# Patient Record
Sex: MALE | Race: WHITE | Employment: OTHER | URBAN - METROPOLITAN AREA
[De-identification: names, ages, dates, MRNs, and addresses within clinical notes are randomized per-mention and may not be internally consistent; named-entity substitution may affect disease eponyms.]

---

## 2018-01-01 ENCOUNTER — OFFICE VISIT (OUTPATIENT)
Dept: NEUROLOGY | Age: 83
End: 2018-01-01

## 2018-01-01 ENCOUNTER — OFFICE VISIT (OUTPATIENT)
Dept: FAMILY MEDICINE CLINIC | Age: 83
End: 2018-01-01

## 2018-01-01 ENCOUNTER — TELEPHONE (OUTPATIENT)
Dept: FAMILY MEDICINE CLINIC | Age: 83
End: 2018-01-01

## 2018-01-01 ENCOUNTER — HOSPITAL ENCOUNTER (EMERGENCY)
Age: 83
Discharge: HOME | End: 2018-11-20
Attending: EMERGENCY MEDICINE | Admitting: EMERGENCY MEDICINE
Payer: MEDICARE

## 2018-01-01 VITALS
BODY MASS INDEX: 25.69 KG/M2 | HEIGHT: 67 IN | DIASTOLIC BLOOD PRESSURE: 84 MMHG | SYSTOLIC BLOOD PRESSURE: 132 MMHG | OXYGEN SATURATION: 95 % | HEART RATE: 58 BPM

## 2018-01-01 VITALS
TEMPERATURE: 97 F | WEIGHT: 164 LBS | RESPIRATION RATE: 18 BRPM | OXYGEN SATURATION: 98 % | DIASTOLIC BLOOD PRESSURE: 70 MMHG | HEIGHT: 68 IN | BODY MASS INDEX: 24.86 KG/M2 | HEART RATE: 65 BPM | SYSTOLIC BLOOD PRESSURE: 135 MMHG

## 2018-01-01 VITALS
HEART RATE: 75 BPM | TEMPERATURE: 97.9 F | HEIGHT: 70 IN | BODY MASS INDEX: 25.05 KG/M2 | OXYGEN SATURATION: 95 % | SYSTOLIC BLOOD PRESSURE: 130 MMHG | WEIGHT: 175 LBS | RESPIRATION RATE: 16 BRPM | DIASTOLIC BLOOD PRESSURE: 59 MMHG

## 2018-01-01 DIAGNOSIS — K94.00 COLOSTOMY COMPLICATION (HCC): Primary | ICD-10-CM

## 2018-01-01 DIAGNOSIS — L85.3 DRY SKIN DERMATITIS: ICD-10-CM

## 2018-01-01 DIAGNOSIS — Z43.2 ILEOSTOMY CARE (HCC): ICD-10-CM

## 2018-01-01 DIAGNOSIS — Z85.46 HISTORY OF PROSTATE CANCER: ICD-10-CM

## 2018-01-01 DIAGNOSIS — I10 ESSENTIAL HYPERTENSION: ICD-10-CM

## 2018-01-01 DIAGNOSIS — G20 PD (PARKINSON'S DISEASE) (HCC): Primary | ICD-10-CM

## 2018-01-01 DIAGNOSIS — R26.9 GAIT ABNORMALITY: ICD-10-CM

## 2018-01-01 DIAGNOSIS — Z85.118 HISTORY OF LUNG CANCER: ICD-10-CM

## 2018-01-01 DIAGNOSIS — R41.3 MEMORY LOSS: ICD-10-CM

## 2018-01-01 DIAGNOSIS — G20 PARKINSON DISEASE (HCC): Primary | ICD-10-CM

## 2018-01-01 DIAGNOSIS — Z85.048 HISTORY OF COLORECTAL CANCER: ICD-10-CM

## 2018-01-01 LAB
ALBUMIN SERPL-MCNC: 3.2 G/DL (ref 3.5–5)
ALBUMIN/GLOB SERPL: 1 {RATIO} (ref 1.1–2.2)
ALP SERPL-CCNC: 76 U/L (ref 45–117)
ALT SERPL-CCNC: 9 U/L (ref 12–78)
ANION GAP SERPL CALC-SCNC: 7 MMOL/L (ref 5–15)
AST SERPL-CCNC: 11 U/L (ref 15–37)
BASOPHILS # BLD: 0 K/UL (ref 0–0.1)
BASOPHILS NFR BLD: 1 % (ref 0–1)
BILIRUB SERPL-MCNC: 0.5 MG/DL (ref 0.2–1)
BUN SERPL-MCNC: 21 MG/DL (ref 6–20)
BUN/CREAT SERPL: 33 (ref 12–20)
CALCIUM SERPL-MCNC: 8.3 MG/DL (ref 8.5–10.1)
CHLORIDE SERPL-SCNC: 105 MMOL/L (ref 97–108)
CO2 SERPL-SCNC: 30 MMOL/L (ref 21–32)
COMMENT, HOLDF: NORMAL
CREAT SERPL-MCNC: 0.63 MG/DL (ref 0.7–1.3)
DIFFERENTIAL METHOD BLD: ABNORMAL
EOSINOPHIL # BLD: 0.3 K/UL (ref 0–0.4)
EOSINOPHIL NFR BLD: 5 % (ref 0–7)
ERYTHROCYTE [DISTWIDTH] IN BLOOD BY AUTOMATED COUNT: 16 % (ref 11.5–14.5)
GLOBULIN SER CALC-MCNC: 3.3 G/DL (ref 2–4)
GLUCOSE SERPL-MCNC: 104 MG/DL (ref 65–100)
HCT VFR BLD AUTO: 34.4 % (ref 36.6–50.3)
HGB BLD-MCNC: 10.5 G/DL (ref 12.1–17)
IMM GRANULOCYTES # BLD: 0 K/UL (ref 0–0.04)
IMM GRANULOCYTES NFR BLD AUTO: 0 % (ref 0–0.5)
LYMPHOCYTES # BLD: 1.1 K/UL (ref 0.8–3.5)
LYMPHOCYTES NFR BLD: 18 % (ref 12–49)
MCH RBC QN AUTO: 26.9 PG (ref 26–34)
MCHC RBC AUTO-ENTMCNC: 30.5 G/DL (ref 30–36.5)
MCV RBC AUTO: 88.2 FL (ref 80–99)
MONOCYTES # BLD: 0.7 K/UL (ref 0–1)
MONOCYTES NFR BLD: 12 % (ref 5–13)
NEUTS SEG # BLD: 3.8 K/UL (ref 1.8–8)
NEUTS SEG NFR BLD: 65 % (ref 32–75)
NRBC # BLD: 0 K/UL (ref 0–0.01)
NRBC BLD-RTO: 0 PER 100 WBC
PLATELET # BLD AUTO: 223 K/UL (ref 150–400)
PMV BLD AUTO: 9.6 FL (ref 8.9–12.9)
POTASSIUM SERPL-SCNC: 3.6 MMOL/L (ref 3.5–5.1)
PROT SERPL-MCNC: 6.5 G/DL (ref 6.4–8.2)
RBC # BLD AUTO: 3.9 M/UL (ref 4.1–5.7)
SAMPLES BEING HELD,HOLD: NORMAL
SODIUM SERPL-SCNC: 142 MMOL/L (ref 136–145)
WBC # BLD AUTO: 5.9 K/UL (ref 4.1–11.1)

## 2018-01-01 PROCEDURE — 99284 EMERGENCY DEPT VISIT MOD MDM: CPT

## 2018-01-01 PROCEDURE — 85025 COMPLETE CBC W/AUTO DIFF WBC: CPT

## 2018-01-01 PROCEDURE — 36415 COLL VENOUS BLD VENIPUNCTURE: CPT

## 2018-01-01 PROCEDURE — 80053 COMPREHEN METABOLIC PANEL: CPT

## 2018-01-01 RX ORDER — HYDROCODONE BITARTRATE AND ACETAMINOPHEN 10; 325 MG/1; MG/1
1 TABLET ORAL
Status: ON HOLD | COMMUNITY
End: 2019-01-01 | Stop reason: SDUPTHER

## 2018-01-01 RX ORDER — HYDROCODONE BITARTRATE AND ACETAMINOPHEN 10; 325 MG/1; MG/1
1 TABLET ORAL
OUTPATIENT
Start: 2018-01-01

## 2018-01-01 RX ORDER — CARBIDOPA AND LEVODOPA 25; 100 MG/1; MG/1
1 TABLET ORAL 2 TIMES DAILY
COMMUNITY
End: 2018-01-01

## 2018-01-01 RX ORDER — CARBIDOPA AND LEVODOPA 25; 100 MG/1; MG/1
TABLET ORAL
Qty: 300 TAB | Refills: 3 | Status: SHIPPED | OUTPATIENT
Start: 2018-01-01 | End: 2019-01-01

## 2018-01-01 RX ORDER — CARBIDOPA AND LEVODOPA 10; 100 MG/1; MG/1
1 TABLET ORAL 3 TIMES DAILY
COMMUNITY
End: 2018-01-01

## 2018-11-20 NOTE — ED PROVIDER NOTES
Ostomy was leaking last night Has no supplies Just moved here from [facility] 3 days ago. Has been staying in [facility] recently in a hotel. Wife says they found a house here in [place] now. Does not know where supplies are. Has Parkinsons Had Colostomy LLQ since 3 yrs ago. Is , lives with wife Nonsmoker Has chronic generalized mild weakness 2/2 Parkinsons Good appetite No recent illness No pain Needs: PCP Stoma nurse CM consult. Now lives locally Past Medical History:  
Diagnosis Date  Colon cancer ([facility]  Hypertension  Lung cancer ([facility]  Parkinson disease ([facility]  Prostate cancer ([facility] Past Surgical History:  
Procedure Laterality Date  HX COLOSTOMY  HX PROSTATECTOMY History reviewed. No pertinent family history. Social History Socioeconomic History  Marital status:  Spouse name: Not on file  Number of children: Not on file  Years of education: Not on file  Highest education level: Not on file Social Needs  Financial resource strain: Not on file  Food insecurity - worry: Not on file  Food insecurity - inability: Not on file  Transportation needs - medical: Not on file  Transportation needs - non-medical: Not on file Occupational History  Not on file Tobacco Use  Smoking status: Former Smoker  Smokeless tobacco: Never Used Substance and Sexual Activity  Alcohol use: Yes Frequency: Never Comment: occ  Drug use: Not on file  Sexual activity: Not on file Other Topics Concern  Not on file Social History Narrative  Not on file ALLERGIES: Patient has no allergy information on record. Review of Systems All other systems reviewed and are negative. Vitals:  
 11/20/18 1157 11/20/18 1230 11/20/18 1300 BP: 100/51 119/52 134/55 Pulse: 71 Resp: 16 Temp: 97.3 °F (36.3 °C) SpO2: 98% 97% 96% Weight: 79.4 kg (175 lb) Height: 5' 10\" (1.778 m) Physical Exam  
Constitutional: No distress. Frail, elderly, awake, alert. HENT:  
Head: Normocephalic. Neck: No tracheal deviation present. Cardiovascular: Normal rate and normal heart sounds. Pulmonary/Chest: Effort normal.  
Abdominal: Soft. He exhibits no distension. There is no tenderness. Colostomy LLQ Neurological: He is alert. Skin: Skin is warm and dry. Capillary refill takes less than 2 seconds. He is not diaphoretic. Psychiatric: He has a normal mood and affect. MDM Procedures 
 
 
 
consulted ostomy nurse Consulted case management as well New move to [facility] Wife says she and pt need referrals to a new PCP and  
 
 
CM has arranged: PCP appt tomorrow PT eval next week Ostomy nurse visits Will also [facility] to get [facility] Reviewed CBC WITH AUTOMATED DIFF - Abnormal; Notable for the following components:  
    Result Value RBC 3.90 (*) HGB 10.5 (*) HCT 34.4 (*)   
 RDW 16.0 (*) All other components within normal limits METABOLIC PANEL, COMPREHENSIVE - Abnormal; Notable for the following components:  
 Glucose 104 (*) BUN 21 (*) Creatinine 0.63 (*)   
 BUN/Creatinine ratio 33 (*) Calcium 8.3 (*) ALT (SGPT) 9 (*) AST (SGOT) 11 (*) Albumin 3.2 (*) A-G Ratio 1.0 (*) All other components within normal limits SAMPLES BEING HELD  
SAMPLES BEING HELD  
SAMPLE TO BLOOD BANK

## 2018-11-20 NOTE — PROGRESS NOTES
11/20/2018 
2:00 PM 
Case management note Met with patient and spouse to discuss discharge planning. Confirmed demographics. Patient and spouse just moved to [place] from [place]. Patient needed help with ostomy. Patient and wife could benefit form some medical help with setting up appointments as they know no one and their children dont live close. Daysi from ostomy helped them out with supplies. Set up PCP appointment with [facility] so we could order home health. 11/21/18 @ 1000 with Dr. Coker [facility] through Tatyana Jefferson, [facility] Referral sent through [facility] Gave wife my card to assist her with additional follow up Jone San, [address]

## 2018-11-20 NOTE — ED NOTES
Pt arrived to the ED AAOX4, with a c/c of \"leaking ostomy\" onset last night. Pt denies any abd pain and verbalizes no other complaint at this time. Pt is now in ED room with family at bedside, side rail up, bed to lowest position and call light within reach. VS in stable condition, will continue to monitor.

## 2018-11-20 NOTE — ED NOTES
Janeth Fan states Ostomy care is completed and pt has been provided with Ostomy supplies. EDP notified.

## 2018-11-20 NOTE — DISCHARGE INSTRUCTIONS
Ostomy Care: Care Instructions  Your Care Instructions    When a part of your intestine doesn't work as it should, a doctor can do surgery to make an opening in your belly and bring a part of your intestine to the surface of your skin. This opening is called an ostomy. There are two types. A colostomy is an ostomy of the colon. An ileostomy is an ostomy of the small intestine. With an ostomy, waste no longer leaves your body from your anus. It leaves your body through the part of your intestine at the ostomy opening. This part of the intestine is called the stoma. There's no muscle around the stoma. So you can't control when waste or gas leaves your body. Now your waste automatically goes from the stoma into a plastic bag (pouch) around the stoma. This pouch will block the smell of the waste. It can't be seen when you are wearing clothes. You can learn to take care of your ostomy. Good care can make living with a stoma easier. It can help keep a good seal between the skin and the pouch. This can prevent your skin from getting irritated. Follow-up care is a key part of your treatment and safety. Be sure to make and go to all appointments, and call your doctor if you are having problems. It's also a good idea to know your test results and keep a list of the medicines you take. How can you care for yourself at home? · If the skin under your pouch is red, irritated, or itchy, you need to treat your skin. Follow these steps:  ? Gently remove the pouch. ? Clean the skin under the pouch with water. ? Dry the skin. ? Sprinkle ostomy powder on the skin. Then gently wipe off the extra powder. ? Reattach or replace the pouch. · If you continue to have skin irritation, talk to your ostomy nurse. · Follow all instructions from your ostomy nurse. · Empty and replace your ostomy pouch as often as your nurse recommends. · Be safe with medicines. Take your medicines exactly as prescribed.  Call your doctor if you think you are having a problem with your medicine. You will get more details on the specific medicines your doctor prescribes. When should you call for help? Call your doctor now or seek immediate medical care if:    · You are vomiting.     · You have new or worse belly pain.     · You have a fever.     · You cannot pass stools or gas.    Watch closely for changes in your health, and be sure to contact your doctor if:    · Your stoma turns pale or changes color.     · Your stoma swells or bleeds.     · You have little or no waste going into your pouch. Where can you learn more? Go to http://dee-rogelio.info/. Enter 11 [id number] 100 in the search box to learn more about \"Ostomy Care: Care Instructions. \"  Current as of: March 28, 2018  Content Version: 11.8  © 7671-1724 [employer]wise, Incorporated. Care instructions adapted under license by [employer] (which disclaims liability or warranty for this information). If you have questions about a medical condition or this instruction, always ask your healthcare professional. [place] [address] any warranty or liability for your use of this information.

## 2018-11-21 NOTE — PROGRESS NOTES
[address] [facility] Cristhian Velasquez  Office [phone], Fax [phone] Subjective: Chief Complaint Patient presents with Britcriseldaia.Ciarraeks Establish Care History provided by patient HPI: 
Rima Cotnreras is a 80 y.o.  male presents to establish care. Significant history pertinent to presentation includes parkinson's, HTN, prostate cancer (1982), colon cancer s/p ileostomy s/p chemo/radiation (1988), and lung cancer s/p pneumonectomy (1994). Ostomy bag from ileostomy 2/2 colorectal cancer in 1988. - Pt had just moved from [place] and did not have enough supplies and ended up in ED 2/2 to ostomy leak. [facility] was set up for him by the ER and sent to establish care with PCP. He is doing well now and knows how to care for it. Denies fever/chills. He would like to also have referral to a general surgeon to establish care. Parkinson's disease.  
- Pt is on carbidopa and takes hydrocodone for pain in joints. Says he would like pain management. Pt has enough refills. Would like to establish care with neurologist and would like referral.  
 
HTN - not on any meds. Medication reviewed. Allergy reviewed. ROS (bolded are positive):  
General Negative for fever, chills, changes in weight, changes in appetite HEENT Negative for hearing loss, earache, congestion, sore throat CV Negative for chest pain, palpitations, edema Respiratory Negative for cough, shortness of breath, wheezing GI Negative for change in bowel habits, abdominal pain, black or bloody stools, nausea or vomiting, ostomy leak MSK Negative for back pain, joint pain, muscle pain Skin Negative for itching, rash, hives Neuro Negative for stiffness, dizziness, headache, confusion, weakness Psych Negative for anxiety, depression, change in mood Heme/lymph Negative for bleeding, bruising, pallor Objective:  
Vitals - reviewed Visit Vitals /70 (BP 1 Location: Left arm, BP Patient Position: Sitting) Pulse 65 Temp 97 °F (36.1 °C) (Oral) Resp 18 Ht 5' 7.5\" (1.715 m) Wt 164 lb (74.4 kg) SpO2 98% BMI 25.31 kg/m² Physical exam:  
GEN: NAD. Alert. thin LUNGS: Respirations unlabored; CTAB. no wheeze, rales, rhonchi CARDIOVASCULAR: Regular, rate, and rhythm without murmurs, gallops or rubs ABDOMEN: Soft; NT, ND. +bowel sounds; no rebound tenderness, no guarding. +ostomy bag (clean around dressing). NEUROLOGIC:  No focal neurologic deficits. Strength and sensation grossly intact. +mild cog wheel rigidity and overall bradykinesia EXT: Well perfused. Trace edema. No erythema. PSYCH: appropriate mood and affect. Good insight and judgement. Cooperative. SKIN: dry skin on foot b/l Pertinent Labs/Studies:  
- getting records. Assessment and orders: ICD-10-CM ICD-9-CM 1. Parkinson disease ([facility] G20 332.0 REFERRAL TO NEUROLOGY 2. Ileostomy care ([facility] Z43.2 V55.2 REFERRAL TO GENERAL SURGERY 3. Essential hypertension I10 401.9 4. Dry skin dermatitis L85.3 692.89 REFERRAL TO PODIATRY 5. History of colorectal cancer Z85.038 V10.05   
6. History of prostate cancer Z85.46 V10.46   
7. History of lung cancer Z85. 118 V10.11 Diagnoses and all orders for this visit: 1. Parkinson disease ([facility] Pt taking carbidopa as prescribed. Will refer to Neurology to establish care as recently moved from [place]. Pt on hydrocodone for pain as well in relation to PD. Told to discuss with Neurology and also request to be seen by our sports medicine physician if he is having specific joint pains.  
-     REFERRAL TO NEUROLOGY 2. Ileostomy care [facility]). Has [address] to help with the care. Will refer to Gen Surg to establish care. -     REFERRAL TO GENERAL SURGERY 3. Essential hypertension. Diet controlled. Continue to monitor. 4. Dry skin dermatitis. Mainly on the foot. Unkept. Referral to Podiatiry as below.  
-     [address] [place] [address] 5. History of colorectal cancer. Pt says it is stable with no mets and does need referral to heme/onc. 6. History of prostate cancer. Refer to #5 7. History of lung cancer. Refer to #5 Follow-up Disposition: 
Return in about 1 month (around 12/21/2018). Pt was discussed with Dr Doug Saeed (attending physician). I have reviewed patient medical and social history and medications. I have reviewed pertinent labs results and other data. I have discussed the diagnosis with the patient and the intended plan as seen in the above orders. The patient has received an after-visit summary and questions were answered concerning future plans. I have discussed medication side effects and warnings with the patient as well. Aravind Newberry MD 
Resident [facility] [place] [facility] 11/21/18

## 2018-11-22 NOTE — PROGRESS NOTES
[address] Medicine Residency Attending Addendum: I saw and evaluated the patient on the day of the encounter with Taylor Morrow MD 
, performing the key elements of the service. I discussed the findings, assessment and plan with the resident and agree with the resident's findings and plan as documented in the resident's note. Radha Martinez MD, CAQSM, RMSK

## 2018-11-23 NOTE — PROGRESS NOTES
[address] Medicine Residency Attending Addendum: I saw and evaluated the patient on the day of the encounter with Alex Coats MD 
, performing the key elements of the service. I discussed the findings, assessment and plan with the resident and agree with the resident's findings and plan as documented in the resident's note. Brenton Painting MD, CAQSM, RMSK

## 2018-12-05 NOTE — TELEPHONE ENCOUNTER
----- Message from Jaymie Gillis sent at 12/5/2018  4:01 PM EST -----  Regarding: Dr. Kerry Marsh a speech therapist with [facility], will seeing patient twice a week for the next 2 weeks stating next week for speech Therapy Sarkis Walsh picked the pt up to decrease drooling. Sarkis Walsh best contact number is [phone].

## 2018-12-06 NOTE — TELEPHONE ENCOUNTER
Per Cristhian Richardson with Neurology    Pt appt is 12/13/18 at 12:30pm  Office ph [phone]  Fax [phone]    Patient will see the below mentioned doctor noted in order  [id number] - REFERRAL TO NEUROLOGY None Rosana Jalloh MD 1 1   Diagnosis Information     Diagnosis   G20 (ICD-10-CM) - Parkinson disease ([facility]

## 2018-12-07 NOTE — TELEPHONE ENCOUNTER
Wife, Mrs. Vinie Cushing  FS---[phone]    She called for this refill and said he is running low. Said he was given #60 of this in the past.  He has not gone to pain management  here as they have just moved here from  [place]. They do not know who to see for pain management in this area and is asking for help in getting one. She said they are not familiar with the pharmacies in this area.        (she was advised of the refill policy of this office)

## 2018-12-13 NOTE — TELEPHONE ENCOUNTER
Pt would need to ask his insurance company which pain management doctors in the area that he can see especially as they have level 1, 2, 3 providers. Once then we can make the referral.     Thanks.

## 2018-12-13 NOTE — PROGRESS NOTES
[facility]   [place] [address] Anthony Shipley [address] Velasquez, [address]   [phone] [facility]   [phone] Fax             Referring: Dr. Magan Sparks      Chief Complaint   Patient presents with    Parkinsons Disease     71-year-old gentleman who presents today accompanied by his wife for evaluation with equal Parkinson's disease. Neither are not great historians. Patient has been seen at [address] [place] [address] but apparently the notes are not visible in [facility] care. As I understand it with the patient has had shaking of his bilateral upper extremities left greater than right for perhaps a year may be 2 years or so. They are unsure about the time. .  They indicate that he has shaking of the left hand particularly at rest.  He has had shuffling gait. He drools. No recent falls. He uses a cane or a Rollator. He was apparently diagnosed with Parkinson's disease although his wife says no one officially diagnosed him they just said he looks like he has Parkinson's gave him Sinemet. He has been on Sinemet they believe 25/100 tablets and his wife confirms this although he is uncertain but he is taking 2 tablets every 6 hours. He does not have a set schedule although he does do every 6 hours once he gets up. He typically gets up around 4 AM has a couple coffee takes his medicine at 5 and then goes back to bed. He goes to bed around 10 PM.  He does get up to go to the bathroom at night. He and his wife live alone in a [facility]. They just moved up here about 4 weeks ago with her planning to move back to [place]. They are unsure who their previous neurologist was although they did see  in [facility] and certainly we will ask for notes. He has never been on any other medicines for Parkinson's. They are unsure of any testing. He does have an ostomy secondary to a diagnosis of colon cancer back in 2018 and he says he was \"over radiated\".   They have home health in place including physical occupational and speech therapy. Both are coming twice a week. He has an aide that comes to help with bathing and grooming and his wife says that that is infrequent and spotty. She believes his memory is worse. They tend to argue back and forth during the history at times and again not the greatest of historians. Past Medical History:   Diagnosis Date    Colon cancer ([facility]     Hypertension     Lung cancer ([facility]     Parkinson disease ([facility]     [facility]        Past Surgical History:   Procedure Laterality Date    HX COLOSTOMY      HX PROSTATECTOMY         Current Outpatient Medications   Medication Sig Dispense Refill    carbidopa-levodopa (SINEMET)  mg per tablet Take 1 Tab by mouth two (2) times a day.  HYDROcodone-acetaminophen (NORCO)  mg tablet Take 1 Tab by mouth.  carbidopa-levodopa (SINEMET)  mg per tablet Take 1 Tab by mouth three (3) times daily. UNABLE TO RECALL DOSE. Pt is not sure of his medication    No Known Allergies    Social History     Tobacco Use    Smoking status: Former Smoker     Start date: 11/21/2010    Smokeless tobacco: Never Used   Substance Use Topics    Alcohol use: Yes     Alcohol/week: 0.6 oz     Types: 1 Cans of beer per week     Frequency: Never     Comment: occ    Drug use: No     History reviewed. No pertinent family history. Review of Systems  Pertinent positives and negatives as noted with remainder of comprehensive review negative    Examination  Visit Vitals  /84   Pulse (!) 58   Ht 5' 7\" (1.702 m)   SpO2 95%   BMI 25.69 kg/m²     He is pleasant. He is quite disheveled. He has unkempt feet. It is about 35 or 40 degrees and he is wearing sandals. I do not hear a bruit. Pulses are symmetrical and his heart regular. He does have some edema of the lower extremities with discoloration and chronic venous stasis changes.     Neurologically he is awake alert oriented to the correct day date floor president and immediate past president. Again he has confusion regarding history and also with medications. He follows all commands. He does get through full versions and there is no limited up gaze or gaze in any other direction. He has no nystagmus. He has saccadic pursuit. His face is symmetrical.  Tongue and palate are midline. He has no rest tremor and he took his medication approximately 5 hours prior to me seeing him. He does have cogwheeling at the bilateral wrists left greater than right. He is bradykinetic. He is masked. He has drooling. He has restricted movement about the shoulders bilaterally (he says physical therapy is working on this). He is able to resist in all muscle groups although again at the deltoid difficulty with a frozen shoulders. He has depressed reflexes throughout. No reflex asymmetry. No pathologic reflexes. Not ataxic. He needs assistance to rise from the chair. He is hunched. He needs his cane for balance. He shuffles. Takes a 10 step turn. Sensory is inconsistent. Impression/Plan  25-year-old gentleman who on examination clearly has parkinsonism. I do not have enough history in terms of course at this point but he does say that the Sinemet has helped him. At this juncture were going to send for old records. We will increase his dose of Sinemet to a dose of 2.5 tablets 4 times daily. I like to get him on the schedule but for right now I think just getting him to take it 4 times a day once he is awake is a first step. May need to add an agonist.  We will have to see how this unfolds over time. Discussed administration, potential side effects, potential benefits and alternatives. We will have him see  [facility]. regarding cognitive difficulties.   Wife says that they hated here in [place] and will be moving to [place] again and ask if I think he is safe to make the trip and I told her I do not think he is safe to drive but I think if he is a passenger there should not be an issue. We will schedule him to come back in about a month and see how this dose is changed and hopefully we will get the records from [place] in that time    Juan R Hamlin MD      This note was created using voice recognition software. Despite editing, there may be syntax errors. This note will not be viewable in [address]

## 2019-01-01 ENCOUNTER — HOSPITAL ENCOUNTER (OUTPATIENT)
Dept: GENERAL RADIOLOGY | Age: 84
Discharge: HOME | End: 2019-01-16
Payer: MEDICARE

## 2019-01-01 ENCOUNTER — OFFICE VISIT (OUTPATIENT)
Dept: NEUROLOGY | Age: 84
End: 2019-01-01

## 2019-01-01 ENCOUNTER — HOME CARE VISIT (OUTPATIENT)
Dept: HOSPICE | Facility: HOSPICE | Age: 84
End: 2019-01-01
Payer: MEDICARE

## 2019-01-01 ENCOUNTER — HOSPITAL ENCOUNTER (OUTPATIENT)
Dept: MRI IMAGING | Age: 84
Discharge: HOME | DRG: 066 | End: 2019-04-23
Attending: NURSE PRACTITIONER
Payer: MEDICARE

## 2019-01-01 ENCOUNTER — HOSPITAL ENCOUNTER (EMERGENCY)
Age: 84
Discharge: NURSING, REHAB OR LONG-TERM CARE | End: 2019-04-29
Attending: STUDENT IN AN ORGANIZED HEALTH CARE EDUCATION/TRAINING PROGRAM
Payer: MEDICARE

## 2019-01-01 ENCOUNTER — HOSPICE ADMISSION (OUTPATIENT)
Dept: HOSPICE | Facility: HOSPICE | Age: 84
End: 2019-01-01
Payer: MEDICARE

## 2019-01-01 ENCOUNTER — DOCUMENTATION ONLY (OUTPATIENT)
Dept: NEUROLOGY | Age: 84
End: 2019-01-01

## 2019-01-01 ENCOUNTER — HOSPITAL ENCOUNTER (INPATIENT)
Age: 84
LOS: 1 days | Discharge: NURSING, REHAB OR LONG-TERM CARE | DRG: 177 | End: 2019-05-07
Attending: STUDENT IN AN ORGANIZED HEALTH CARE EDUCATION/TRAINING PROGRAM | Admitting: INTERNAL MEDICINE
Payer: OTHER MISCELLANEOUS

## 2019-01-01 ENCOUNTER — HOME CARE VISIT (OUTPATIENT)
Dept: SCHEDULING | Facility: HOME HEALTH | Age: 84
End: 2019-01-01
Payer: MEDICARE

## 2019-01-01 ENCOUNTER — APPOINTMENT (OUTPATIENT)
Dept: CT IMAGING | Age: 84
End: 2019-01-01
Attending: STUDENT IN AN ORGANIZED HEALTH CARE EDUCATION/TRAINING PROGRAM
Payer: MEDICARE

## 2019-01-01 ENCOUNTER — APPOINTMENT (OUTPATIENT)
Dept: VASCULAR SURGERY | Age: 84
DRG: 066 | End: 2019-01-01
Attending: NURSE PRACTITIONER
Payer: MEDICARE

## 2019-01-01 ENCOUNTER — APPOINTMENT (OUTPATIENT)
Dept: NON INVASIVE DIAGNOSTICS | Age: 84
DRG: 066 | End: 2019-01-01
Attending: NURSE PRACTITIONER
Payer: MEDICARE

## 2019-01-01 ENCOUNTER — APPOINTMENT (OUTPATIENT)
Dept: GENERAL RADIOLOGY | Age: 84
DRG: 066 | End: 2019-01-01
Attending: INTERNAL MEDICINE
Payer: MEDICARE

## 2019-01-01 ENCOUNTER — APPOINTMENT (OUTPATIENT)
Dept: GENERAL RADIOLOGY | Age: 84
DRG: 177 | End: 2019-01-01
Attending: STUDENT IN AN ORGANIZED HEALTH CARE EDUCATION/TRAINING PROGRAM
Payer: OTHER MISCELLANEOUS

## 2019-01-01 ENCOUNTER — TELEPHONE (OUTPATIENT)
Dept: FAMILY MEDICINE CLINIC | Age: 84
End: 2019-01-01

## 2019-01-01 ENCOUNTER — HOSPITAL ENCOUNTER (EMERGENCY)
Age: 84
Discharge: HOME | DRG: 066 | End: 2019-04-22
Attending: EMERGENCY MEDICINE | Admitting: EMERGENCY MEDICINE
Payer: MEDICARE

## 2019-01-01 ENCOUNTER — HOSPITAL ENCOUNTER (INPATIENT)
Age: 84
LOS: 3 days | Discharge: NURSING, REHAB OR LONG-TERM CARE | DRG: 066 | End: 2019-04-26
Attending: EMERGENCY MEDICINE | Admitting: INTERNAL MEDICINE
Payer: MEDICARE

## 2019-01-01 VITALS
OXYGEN SATURATION: 99 % | WEIGHT: 170 LBS | BODY MASS INDEX: 24.34 KG/M2 | HEART RATE: 77 BPM | SYSTOLIC BLOOD PRESSURE: 133 MMHG | TEMPERATURE: 97.4 F | DIASTOLIC BLOOD PRESSURE: 69 MMHG | HEIGHT: 70 IN | RESPIRATION RATE: 13 BRPM

## 2019-01-01 VITALS
DIASTOLIC BLOOD PRESSURE: 49 MMHG | RESPIRATION RATE: 16 BRPM | SYSTOLIC BLOOD PRESSURE: 106 MMHG | WEIGHT: 170 LBS | TEMPERATURE: 97.3 F | BODY MASS INDEX: 24.34 KG/M2 | HEART RATE: 77 BPM | HEIGHT: 70 IN | OXYGEN SATURATION: 94 %

## 2019-01-01 VITALS — HEART RATE: 72 BPM | OXYGEN SATURATION: 92 % | RESPIRATION RATE: 14 BRPM

## 2019-01-01 VITALS
RESPIRATION RATE: 12 BRPM | HEART RATE: 52 BPM | SYSTOLIC BLOOD PRESSURE: 150 MMHG | DIASTOLIC BLOOD PRESSURE: 70 MMHG | OXYGEN SATURATION: 98 %

## 2019-01-01 VITALS
OXYGEN SATURATION: 97 % | DIASTOLIC BLOOD PRESSURE: 46 MMHG | SYSTOLIC BLOOD PRESSURE: 94 MMHG | RESPIRATION RATE: 16 BRPM | TEMPERATURE: 98.1 F | HEART RATE: 79 BPM

## 2019-01-01 VITALS
RESPIRATION RATE: 16 BRPM | TEMPERATURE: 98.2 F | OXYGEN SATURATION: 97 % | SYSTOLIC BLOOD PRESSURE: 116 MMHG | HEART RATE: 61 BPM | DIASTOLIC BLOOD PRESSURE: 62 MMHG

## 2019-01-01 VITALS
RESPIRATION RATE: 18 BRPM | SYSTOLIC BLOOD PRESSURE: 166 MMHG | BODY MASS INDEX: 24.39 KG/M2 | DIASTOLIC BLOOD PRESSURE: 74 MMHG | HEART RATE: 70 BPM | WEIGHT: 170 LBS | TEMPERATURE: 97.6 F | OXYGEN SATURATION: 95 %

## 2019-01-01 VITALS
DIASTOLIC BLOOD PRESSURE: 73 MMHG | SYSTOLIC BLOOD PRESSURE: 156 MMHG | RESPIRATION RATE: 12 BRPM | OXYGEN SATURATION: 95 % | WEIGHT: 170 LBS | HEART RATE: 75 BPM | BODY MASS INDEX: 24.39 KG/M2 | TEMPERATURE: 98.4 F

## 2019-01-01 VITALS — HEART RATE: 64 BPM | SYSTOLIC BLOOD PRESSURE: 126 MMHG | RESPIRATION RATE: 14 BRPM | DIASTOLIC BLOOD PRESSURE: 60 MMHG

## 2019-01-01 DIAGNOSIS — G20 PARKINSON DISEASE (HCC): Chronic | ICD-10-CM

## 2019-01-01 DIAGNOSIS — I95.9 HYPOTENSION, UNSPECIFIED HYPOTENSION TYPE: ICD-10-CM

## 2019-01-01 DIAGNOSIS — G20 PD (PARKINSON'S DISEASE) (HCC): Primary | ICD-10-CM

## 2019-01-01 DIAGNOSIS — G20 PARKINSON'S DISEASE (HCC): ICD-10-CM

## 2019-01-01 DIAGNOSIS — K94.03 COLOSTOMY DYSFUNCTION (HCC): Primary | ICD-10-CM

## 2019-01-01 DIAGNOSIS — R53.81 DEBILITY: ICD-10-CM

## 2019-01-01 DIAGNOSIS — Z71.89 GOALS OF CARE, COUNSELING/DISCUSSION: ICD-10-CM

## 2019-01-01 DIAGNOSIS — R26.9 GAIT ABNORMALITY: ICD-10-CM

## 2019-01-01 DIAGNOSIS — M54.50 LOW BACK PAIN: ICD-10-CM

## 2019-01-01 DIAGNOSIS — M47.817 LUMBOSACRAL SPONDYLOSIS WITHOUT MYELOPATHY: ICD-10-CM

## 2019-01-01 DIAGNOSIS — R53.83 FATIGUE, UNSPECIFIED TYPE: ICD-10-CM

## 2019-01-01 DIAGNOSIS — R41.82 ALTERED MENTAL STATUS, UNSPECIFIED ALTERED MENTAL STATUS TYPE: ICD-10-CM

## 2019-01-01 DIAGNOSIS — R06.02 SHORTNESS OF BREATH: ICD-10-CM

## 2019-01-01 DIAGNOSIS — J18.9 PNEUMONIA OF RIGHT LOWER LOBE DUE TO INFECTIOUS ORGANISM: Primary | ICD-10-CM

## 2019-01-01 DIAGNOSIS — Z66 DO NOT RESUSCITATE: ICD-10-CM

## 2019-01-01 DIAGNOSIS — R55 SYNCOPE, UNSPECIFIED SYNCOPE TYPE: Primary | ICD-10-CM

## 2019-01-01 DIAGNOSIS — I63.232 CEREBROVASCULAR ACCIDENT (CVA) DUE TO STENOSIS OF LEFT CAROTID ARTERY (HCC): ICD-10-CM

## 2019-01-01 DIAGNOSIS — G89.4 CHRONIC PAIN SYNDROME: ICD-10-CM

## 2019-01-01 DIAGNOSIS — M25.512 LEFT SHOULDER PAIN: ICD-10-CM

## 2019-01-01 DIAGNOSIS — N30.01 ACUTE CYSTITIS WITH HEMATURIA: Primary | ICD-10-CM

## 2019-01-01 DIAGNOSIS — M25.511 RIGHT SHOULDER PAIN: ICD-10-CM

## 2019-01-01 DIAGNOSIS — Z71.89 ADVANCED CARE PLANNING/COUNSELING DISCUSSION: ICD-10-CM

## 2019-01-01 DIAGNOSIS — R40.4 TRANSIENT ALTERATION OF AWARENESS: ICD-10-CM

## 2019-01-01 LAB
ALBUMIN SERPL-MCNC: 3.1 G/DL (ref 3.5–5)
ALBUMIN SERPL-MCNC: 3.2 G/DL (ref 3.5–5)
ALBUMIN SERPL-MCNC: 3.6 G/DL (ref 3.5–5)
ALBUMIN/GLOB SERPL: 0.9 {RATIO} (ref 1.1–2.2)
ALBUMIN/GLOB SERPL: 1.1 {RATIO} (ref 1.1–2.2)
ALBUMIN/GLOB SERPL: 1.1 {RATIO} (ref 1.1–2.2)
ALP SERPL-CCNC: 78 U/L (ref 45–117)
ALP SERPL-CCNC: 81 U/L (ref 45–117)
ALP SERPL-CCNC: 84 U/L (ref 45–117)
ALT SERPL-CCNC: 10 U/L (ref 12–78)
ALT SERPL-CCNC: 7 U/L (ref 12–78)
ALT SERPL-CCNC: <6 U/L (ref 12–78)
AMMONIA PLAS-SCNC: 13 UMOL/L
AMPHET UR QL SCN: NEGATIVE
ANION GAP SERPL CALC-SCNC: 2 MMOL/L (ref 5–15)
ANION GAP SERPL CALC-SCNC: 5 MMOL/L (ref 5–15)
ANION GAP SERPL CALC-SCNC: 6 MMOL/L (ref 5–15)
APPEARANCE UR: ABNORMAL
AST SERPL-CCNC: 13 U/L (ref 15–37)
AST SERPL-CCNC: 6 U/L (ref 15–37)
AST SERPL-CCNC: 9 U/L (ref 15–37)
ATRIAL RATE: 100 BPM
ATRIAL RATE: 91 BPM
BACTERIA SPEC CULT: ABNORMAL
BACTERIA SPEC CULT: NORMAL
BACTERIA URNS QL MICRO: ABNORMAL /HPF
BACTERIA URNS QL MICRO: NEGATIVE /HPF
BACTERIA URNS QL MICRO: NEGATIVE /HPF
BARBITURATES UR QL SCN: NEGATIVE
BASOPHILS # BLD: 0 K/UL (ref 0–0.1)
BASOPHILS # BLD: 0 K/UL (ref 0–0.1)
BASOPHILS # BLD: 0.1 K/UL (ref 0–0.1)
BASOPHILS NFR BLD: 0 % (ref 0–1)
BASOPHILS NFR BLD: 0 % (ref 0–1)
BASOPHILS NFR BLD: 1 % (ref 0–1)
BENZODIAZ UR QL: NEGATIVE
BILIRUB SERPL-MCNC: 0.5 MG/DL (ref 0.2–1)
BILIRUB SERPL-MCNC: 0.7 MG/DL (ref 0.2–1)
BILIRUB SERPL-MCNC: 1 MG/DL (ref 0.2–1)
BILIRUB UR QL CFM: NEGATIVE
BILIRUB UR QL CFM: NEGATIVE
BILIRUB UR QL CFM: POSITIVE
BUN SERPL-MCNC: 13 MG/DL (ref 6–20)
BUN SERPL-MCNC: 18 MG/DL (ref 6–20)
BUN SERPL-MCNC: 19 MG/DL (ref 6–20)
BUN SERPL-MCNC: 19 MG/DL (ref 6–20)
BUN SERPL-MCNC: 23 MG/DL (ref 6–20)
BUN/CREAT SERPL: 30 (ref 12–20)
BUN/CREAT SERPL: 30 (ref 12–20)
BUN/CREAT SERPL: 37 (ref 12–20)
BUN/CREAT SERPL: 40 (ref 12–20)
BUN/CREAT SERPL: 55 (ref 12–20)
CALCIUM SERPL-MCNC: 7.6 MG/DL (ref 8.5–10.1)
CALCIUM SERPL-MCNC: 8.3 MG/DL (ref 8.5–10.1)
CALCIUM SERPL-MCNC: 8.5 MG/DL (ref 8.5–10.1)
CALCIUM SERPL-MCNC: 8.6 MG/DL (ref 8.5–10.1)
CALCIUM SERPL-MCNC: 8.8 MG/DL (ref 8.5–10.1)
CALCULATED P AXIS, ECG09: 3 DEGREES
CALCULATED R AXIS, ECG10: 3 DEGREES
CALCULATED R AXIS, ECG10: 44 DEGREES
CALCULATED T AXIS, ECG11: -39 DEGREES
CALCULATED T AXIS, ECG11: 58 DEGREES
CANNABINOIDS UR QL SCN: NEGATIVE
CAOX CRY URNS QL MICRO: ABNORMAL
CC UR VC: ABNORMAL
CC UR VC: NORMAL
CHLORIDE SERPL-SCNC: 100 MMOL/L (ref 97–108)
CHLORIDE SERPL-SCNC: 102 MMOL/L (ref 97–108)
CHLORIDE SERPL-SCNC: 105 MMOL/L (ref 97–108)
CHLORIDE SERPL-SCNC: 105 MMOL/L (ref 97–108)
CHLORIDE SERPL-SCNC: 107 MMOL/L (ref 97–108)
CHOLEST SERPL-MCNC: 133 MG/DL
CO2 SERPL-SCNC: 27 MMOL/L (ref 21–32)
CO2 SERPL-SCNC: 27 MMOL/L (ref 21–32)
CO2 SERPL-SCNC: 29 MMOL/L (ref 21–32)
CO2 SERPL-SCNC: 29 MMOL/L (ref 21–32)
CO2 SERPL-SCNC: 30 MMOL/L (ref 21–32)
COCAINE UR QL SCN: NEGATIVE
COLOR UR: ABNORMAL
COMMENT, HOLDF: NORMAL
CREAT SERPL-MCNC: 0.42 MG/DL (ref 0.7–1.3)
CREAT SERPL-MCNC: 0.43 MG/DL (ref 0.7–1.3)
CREAT SERPL-MCNC: 0.48 MG/DL (ref 0.7–1.3)
CREAT SERPL-MCNC: 0.52 MG/DL (ref 0.7–1.3)
CREAT SERPL-MCNC: 0.6 MG/DL (ref 0.7–1.3)
DIAGNOSIS, 93000: NORMAL
DIAGNOSIS, 93000: NORMAL
DIFFERENTIAL METHOD BLD: ABNORMAL
DRUG SCRN COMMENT,DRGCM: ABNORMAL
ECHO AO ROOT DIAM: 3.85 CM
ECHO AV AREA PEAK VELOCITY: 1.8 CM2
ECHO AV PEAK GRADIENT: 8.8 MMHG
ECHO AV PEAK VELOCITY: 148.71 CM/S
ECHO LA MAJOR AXIS: 2.79 CM
ECHO LA TO AORTIC ROOT RATIO: 0.72
ECHO LA VOL 4C: 49.14 ML (ref 18–58)
ECHO LA VOLUME INDEX A4C: 25.23 ML/M2 (ref 16–28)
ECHO LV INTERNAL DIMENSION DIASTOLIC: 4.61 CM (ref 4.2–5.9)
ECHO LV INTERNAL DIMENSION SYSTOLIC: 3.34 CM
ECHO LV IVSD: 1.08 CM (ref 0.6–1)
ECHO LV MASS 2D: 228.1 G (ref 88–224)
ECHO LV MASS INDEX 2D: 117.1 G/M2 (ref 49–115)
ECHO LV POSTERIOR WALL DIASTOLIC: 1.22 CM (ref 0.6–1)
ECHO LVOT DIAM: 1.98 CM
ECHO LVOT PEAK GRADIENT: 3 MMHG
ECHO LVOT PEAK VELOCITY: 85.99 CM/S
ECHO MV A VELOCITY: 85.35 CM/S
ECHO MV E DECELERATION TIME (DT): 214.8 MS
ECHO MV E VELOCITY: 71.63 CM/S
ECHO MV E/A RATIO: 0.84
ECHO MV REGURGITANT PEAK GRADIENT: 32.7 MMHG
ECHO MV REGURGITANT PEAK VELOCITY: 286.1 CM/S
ECHO PV MAX VELOCITY: 64.38 CM/S
ECHO PV PEAK GRADIENT: 1.7 MMHG
ECHO RV INTERNAL DIMENSION: 3.52 CM
ECHO TV REGURGITANT MAX VELOCITY: 270.69 CM/S
ECHO TV REGURGITANT PEAK GRADIENT: 29.3 MMHG
EOSINOPHIL # BLD: 0.1 K/UL (ref 0–0.4)
EOSINOPHIL # BLD: 0.1 K/UL (ref 0–0.4)
EOSINOPHIL # BLD: 0.3 K/UL (ref 0–0.4)
EOSINOPHIL NFR BLD: 1 % (ref 0–7)
EOSINOPHIL NFR BLD: 1 % (ref 0–7)
EOSINOPHIL NFR BLD: 3 % (ref 0–7)
EPITH CASTS URNS QL MICRO: ABNORMAL /LPF
ERYTHROCYTE [DISTWIDTH] IN BLOOD BY AUTOMATED COUNT: 18 % (ref 11.5–14.5)
ERYTHROCYTE [DISTWIDTH] IN BLOOD BY AUTOMATED COUNT: 18.2 % (ref 11.5–14.5)
ERYTHROCYTE [DISTWIDTH] IN BLOOD BY AUTOMATED COUNT: 18.2 % (ref 11.5–14.5)
ERYTHROCYTE [DISTWIDTH] IN BLOOD BY AUTOMATED COUNT: 18.4 % (ref 11.5–14.5)
ERYTHROCYTE [DISTWIDTH] IN BLOOD BY AUTOMATED COUNT: 19.3 % (ref 11.5–14.5)
EST. AVERAGE GLUCOSE BLD GHB EST-MCNC: 111 MG/DL
FERRITIN SERPL-MCNC: 142 NG/ML (ref 26–388)
FOLATE SERPL-MCNC: 5.5 NG/ML (ref 5–21)
GLOBULIN SER CALC-MCNC: 3 G/DL (ref 2–4)
GLOBULIN SER CALC-MCNC: 3.2 G/DL (ref 2–4)
GLOBULIN SER CALC-MCNC: 3.4 G/DL (ref 2–4)
GLUCOSE BLD STRIP.AUTO-MCNC: 82 MG/DL (ref 65–100)
GLUCOSE SERPL-MCNC: 74 MG/DL (ref 65–100)
GLUCOSE SERPL-MCNC: 76 MG/DL (ref 65–100)
GLUCOSE SERPL-MCNC: 81 MG/DL (ref 65–100)
GLUCOSE SERPL-MCNC: 88 MG/DL (ref 65–100)
GLUCOSE SERPL-MCNC: 94 MG/DL (ref 65–100)
GLUCOSE UR STRIP.AUTO-MCNC: NEGATIVE MG/DL
HBA1C MFR BLD: 5.5 % (ref 4.2–6.3)
HCT VFR BLD AUTO: 24.9 % (ref 36.6–50.3)
HCT VFR BLD AUTO: 29.3 % (ref 36.6–50.3)
HCT VFR BLD AUTO: 29.5 % (ref 36.6–50.3)
HCT VFR BLD AUTO: 31.8 % (ref 36.6–50.3)
HCT VFR BLD AUTO: 33.1 % (ref 36.6–50.3)
HDLC SERPL-MCNC: 42 MG/DL
HDLC SERPL: 3.2 {RATIO} (ref 0–5)
HGB BLD-MCNC: 10 G/DL (ref 12.1–17)
HGB BLD-MCNC: 7.6 G/DL (ref 12.1–17)
HGB BLD-MCNC: 8.7 G/DL (ref 12.1–17)
HGB BLD-MCNC: 8.9 G/DL (ref 12.1–17)
HGB BLD-MCNC: 9.7 G/DL (ref 12.1–17)
HGB UR QL STRIP: ABNORMAL
HGB UR QL STRIP: ABNORMAL
HGB UR QL STRIP: NEGATIVE
HYALINE CASTS URNS QL MICRO: ABNORMAL /LPF (ref 0–5)
IMM GRANULOCYTES # BLD AUTO: 0 K/UL (ref 0–0.04)
IMM GRANULOCYTES # BLD AUTO: 0.1 K/UL (ref 0–0.04)
IMM GRANULOCYTES # BLD AUTO: 0.1 K/UL (ref 0–0.04)
IMM GRANULOCYTES NFR BLD AUTO: 0 % (ref 0–0.5)
IMM GRANULOCYTES NFR BLD AUTO: 1 % (ref 0–0.5)
IMM GRANULOCYTES NFR BLD AUTO: 1 % (ref 0–0.5)
IRON SATN MFR SERPL: 33 % (ref 20–50)
IRON SERPL-MCNC: 74 UG/DL (ref 35–150)
KETONES UR QL STRIP.AUTO: 15 MG/DL
KETONES UR QL STRIP.AUTO: 40 MG/DL
KETONES UR QL STRIP.AUTO: 80 MG/DL
LACTATE SERPL-SCNC: 0.7 MMOL/L (ref 0.4–2)
LDLC SERPL CALC-MCNC: 73.8 MG/DL (ref 0–100)
LEFT CCA DIST DIAS: 15 CM/S
LEFT CCA DIST SYS: 55.6 CM/S
LEFT CCA PROX DIAS: 11.7 CM/S
LEFT CCA PROX SYS: 49 CM/S
LEFT ECA DIAS: 6.16 CM/S
LEFT ECA SYS: 88.6 CM/S
LEFT ICA DIST DIAS: 22 CM/S
LEFT ICA DIST SYS: 60.7 CM/S
LEFT ICA MID DIAS: 30 CM/S
LEFT ICA MID SYS: 94.7 CM/S
LEFT ICA PROX DIAS: 79.8 CM/S
LEFT ICA PROX SYS: 383.7 CM/S
LEFT ICA/CCA SYS: 6.9
LEFT SUBCLAVIAN DIAS: 0 CM/S
LEFT SUBCLAVIAN SYS: 110.1 CM/S
LEFT VERTEBRAL DIAS: 13.89 CM/S
LEFT VERTEBRAL SYS: 62.4 CM/S
LEUKOCYTE ESTERASE UR QL STRIP.AUTO: ABNORMAL
LIPASE SERPL-CCNC: 26 U/L (ref 73–393)
LIPID PROFILE,FLP: NORMAL
LYMPHOCYTES # BLD: 0.6 K/UL (ref 0.8–3.5)
LYMPHOCYTES # BLD: 0.7 K/UL (ref 0.8–3.5)
LYMPHOCYTES # BLD: 0.7 K/UL (ref 0.8–3.5)
LYMPHOCYTES NFR BLD: 6 % (ref 12–49)
LYMPHOCYTES NFR BLD: 9 % (ref 12–49)
LYMPHOCYTES NFR BLD: 9 % (ref 12–49)
MAGNESIUM SERPL-MCNC: 1.6 MG/DL (ref 1.6–2.4)
MAGNESIUM SERPL-MCNC: 1.7 MG/DL (ref 1.6–2.4)
MCH RBC QN AUTO: 23.5 PG (ref 26–34)
MCH RBC QN AUTO: 23.9 PG (ref 26–34)
MCH RBC QN AUTO: 24.4 PG (ref 26–34)
MCH RBC QN AUTO: 24.4 PG (ref 26–34)
MCH RBC QN AUTO: 24.6 PG (ref 26–34)
MCHC RBC AUTO-ENTMCNC: 29.5 G/DL (ref 30–36.5)
MCHC RBC AUTO-ENTMCNC: 30.2 G/DL (ref 30–36.5)
MCHC RBC AUTO-ENTMCNC: 30.4 G/DL (ref 30–36.5)
MCHC RBC AUTO-ENTMCNC: 30.5 G/DL (ref 30–36.5)
MCHC RBC AUTO-ENTMCNC: 30.5 G/DL (ref 30–36.5)
MCV RBC AUTO: 78.3 FL (ref 80–99)
MCV RBC AUTO: 79.5 FL (ref 80–99)
MCV RBC AUTO: 80.3 FL (ref 80–99)
MCV RBC AUTO: 80.6 FL (ref 80–99)
MCV RBC AUTO: 80.9 FL (ref 80–99)
METHADONE UR QL: NEGATIVE
MONOCYTES # BLD: 0.4 K/UL (ref 0–1)
MONOCYTES # BLD: 0.6 K/UL (ref 0–1)
MONOCYTES # BLD: 0.7 K/UL (ref 0–1)
MONOCYTES NFR BLD: 5 % (ref 5–13)
MONOCYTES NFR BLD: 6 % (ref 5–13)
MONOCYTES NFR BLD: 8 % (ref 5–13)
MUCOUS THREADS URNS QL MICRO: ABNORMAL /LPF
NEUTS SEG # BLD: 6.7 K/UL (ref 1.8–8)
NEUTS SEG # BLD: 6.7 K/UL (ref 1.8–8)
NEUTS SEG # BLD: 8 K/UL (ref 1.8–8)
NEUTS SEG NFR BLD: 81 % (ref 32–75)
NEUTS SEG NFR BLD: 82 % (ref 32–75)
NEUTS SEG NFR BLD: 86 % (ref 32–75)
NITRITE UR QL STRIP.AUTO: NEGATIVE
NRBC # BLD: 0 K/UL (ref 0–0.01)
NRBC BLD-RTO: 0 PER 100 WBC
OPIATES UR QL: POSITIVE
P-R INTERVAL, ECG05: 140 MS
PCP UR QL: NEGATIVE
PH UR STRIP: 6 [PH] (ref 5–8)
PHOSPHATE SERPL-MCNC: 2.7 MG/DL (ref 2.6–4.7)
PLATELET # BLD AUTO: 204 K/UL (ref 150–400)
PLATELET # BLD AUTO: 241 K/UL (ref 150–400)
PLATELET # BLD AUTO: 266 K/UL (ref 150–400)
PLATELET # BLD AUTO: 271 K/UL (ref 150–400)
PLATELET # BLD AUTO: 333 K/UL (ref 150–400)
PMV BLD AUTO: 10.1 FL (ref 8.9–12.9)
PMV BLD AUTO: 10.2 FL (ref 8.9–12.9)
PMV BLD AUTO: 10.4 FL (ref 8.9–12.9)
PMV BLD AUTO: 10.6 FL (ref 8.9–12.9)
PMV BLD AUTO: 9.7 FL (ref 8.9–12.9)
POTASSIUM SERPL-SCNC: 3.2 MMOL/L (ref 3.5–5.1)
POTASSIUM SERPL-SCNC: 3.3 MMOL/L (ref 3.5–5.1)
POTASSIUM SERPL-SCNC: 3.5 MMOL/L (ref 3.5–5.1)
POTASSIUM SERPL-SCNC: 3.5 MMOL/L (ref 3.5–5.1)
POTASSIUM SERPL-SCNC: 3.9 MMOL/L (ref 3.5–5.1)
PROCALCITONIN SERPL-MCNC: <0.1 NG/ML
PROT SERPL-MCNC: 6.2 G/DL (ref 6.4–8.2)
PROT SERPL-MCNC: 6.5 G/DL (ref 6.4–8.2)
PROT SERPL-MCNC: 6.8 G/DL (ref 6.4–8.2)
PROT UR STRIP-MCNC: 30 MG/DL
PROT UR STRIP-MCNC: 300 MG/DL
PROT UR STRIP-MCNC: 300 MG/DL
Q-T INTERVAL, ECG07: 210 MS
Q-T INTERVAL, ECG07: 386 MS
QRS DURATION, ECG06: 44 MS
QRS DURATION, ECG06: 88 MS
QTC CALCULATION (BEZET), ECG08: 359 MS
QTC CALCULATION (BEZET), ECG08: 474 MS
RBC # BLD AUTO: 3.09 M/UL (ref 4.1–5.7)
RBC # BLD AUTO: 3.65 M/UL (ref 4.1–5.7)
RBC # BLD AUTO: 3.71 M/UL (ref 4.1–5.7)
RBC # BLD AUTO: 4.06 M/UL (ref 4.1–5.7)
RBC # BLD AUTO: 4.09 M/UL (ref 4.1–5.7)
RBC #/AREA URNS HPF: >100 /HPF (ref 0–5)
RBC #/AREA URNS HPF: ABNORMAL /HPF (ref 0–5)
RBC #/AREA URNS HPF: ABNORMAL /HPF (ref 0–5)
RBC MORPH BLD: ABNORMAL
RETICS # AUTO: 0.05 M/UL (ref 0.03–0.1)
RETICS/RBC NFR AUTO: 1.3 % (ref 0.7–2.1)
RIGHT CCA DIST DIAS: 15.5 CM/S
RIGHT CCA DIST SYS: 73.7 CM/S
RIGHT CCA PROX DIAS: 15.5 CM/S
RIGHT CCA PROX SYS: 73.7 CM/S
RIGHT ECA DIAS: 0 CM/S
RIGHT ECA SYS: 102.7 CM/S
RIGHT ICA DIST DIAS: 28.4 CM/S
RIGHT ICA DIST SYS: 89.8 CM/S
RIGHT ICA MID DIAS: 23.6 CM/S
RIGHT ICA MID SYS: 85 CM/S
RIGHT ICA PROX DIAS: 22 CM/S
RIGHT ICA PROX SYS: 88.2 CM/S
RIGHT ICA/CCA SYS: 1.2
RIGHT SUBCLAVIAN DIAS: 0 CM/S
RIGHT SUBCLAVIAN SYS: 83.4 CM/S
RIGHT VERTEBRAL DIAS: 12.28 CM/S
RIGHT VERTEBRAL SYS: 43 CM/S
SAMPLES BEING HELD,HOLD: NORMAL
SERVICE CMNT-IMP: ABNORMAL
SERVICE CMNT-IMP: NORMAL
SODIUM SERPL-SCNC: 133 MMOL/L (ref 136–145)
SODIUM SERPL-SCNC: 135 MMOL/L (ref 136–145)
SODIUM SERPL-SCNC: 138 MMOL/L (ref 136–145)
SODIUM SERPL-SCNC: 138 MMOL/L (ref 136–145)
SODIUM SERPL-SCNC: 142 MMOL/L (ref 136–145)
SP GR UR REFRACTOMETRY: 1.03 (ref 1–1.03)
TIBC SERPL-MCNC: 222 UG/DL (ref 250–450)
TRIGL SERPL-MCNC: 86 MG/DL (ref ?–150)
TSH SERPL DL<=0.05 MIU/L-ACNC: 1.23 UIU/ML (ref 0.36–3.74)
UR CULT HOLD, URHOLD: NORMAL
UROBILINOGEN UR QL STRIP.AUTO: 0.2 EU/DL (ref 0.2–1)
UROBILINOGEN UR QL STRIP.AUTO: 1 EU/DL (ref 0.2–1)
UROBILINOGEN UR QL STRIP.AUTO: 1 EU/DL (ref 0.2–1)
VENTRICULAR RATE, ECG03: 176 BPM
VENTRICULAR RATE, ECG03: 91 BPM
VIT B12 SERPL-MCNC: 372 PG/ML (ref 193–986)
VLDLC SERPL CALC-MCNC: 17.2 MG/DL
WBC # BLD AUTO: 3.6 K/UL (ref 4.1–11.1)
WBC # BLD AUTO: 5.2 K/UL (ref 4.1–11.1)
WBC # BLD AUTO: 8.2 K/UL (ref 4.1–11.1)
WBC # BLD AUTO: 8.3 K/UL (ref 4.1–11.1)
WBC # BLD AUTO: 9.4 K/UL (ref 4.1–11.1)
WBC URNS QL MICRO: >100 /HPF (ref 0–4)
WBC URNS QL MICRO: ABNORMAL /HPF (ref 0–4)
WBC URNS QL MICRO: ABNORMAL /HPF (ref 0–4)

## 2019-01-01 PROCEDURE — 99218 HC RM OBSERVATION: CPT

## 2019-01-01 PROCEDURE — 81001 URINALYSIS AUTO W/SCOPE: CPT

## 2019-01-01 PROCEDURE — A9270 NON-COVERED ITEM OR SERVICE: HCPCS

## 2019-01-01 PROCEDURE — 77030010541

## 2019-01-01 PROCEDURE — 94760 N-INVAS EAR/PLS OXIMETRY 1: CPT

## 2019-01-01 PROCEDURE — 93005 ELECTROCARDIOGRAM TRACING: CPT

## 2019-01-01 PROCEDURE — 74011250636 HC RX REV CODE- 250/636: Performed by: INTERNAL MEDICINE

## 2019-01-01 PROCEDURE — 77030011641 HC PASTE OST ADH BMS -A

## 2019-01-01 PROCEDURE — T4541 LARGE DISPOSABLE UNDERPAD: HCPCS

## 2019-01-01 PROCEDURE — 80053 COMPREHEN METABOLIC PANEL: CPT

## 2019-01-01 PROCEDURE — A4452 WATERPROOF TAPE: HCPCS

## 2019-01-01 PROCEDURE — 74011250637 HC RX REV CODE- 250/637: Performed by: NURSE PRACTITIONER

## 2019-01-01 PROCEDURE — 82962 GLUCOSE BLOOD TEST: CPT

## 2019-01-01 PROCEDURE — 87040 BLOOD CULTURE FOR BACTERIA: CPT

## 2019-01-01 PROCEDURE — 82746 ASSAY OF FOLIC ACID SERUM: CPT

## 2019-01-01 PROCEDURE — 97530 THERAPEUTIC ACTIVITIES: CPT

## 2019-01-01 PROCEDURE — 93306 TTE W/DOPPLER COMPLETE: CPT

## 2019-01-01 PROCEDURE — 83735 ASSAY OF MAGNESIUM: CPT

## 2019-01-01 PROCEDURE — 76450000000

## 2019-01-01 PROCEDURE — 97165 OT EVAL LOW COMPLEX 30 MIN: CPT

## 2019-01-01 PROCEDURE — 77030020186 HC BOOT HL PROTCT SAGE -B

## 2019-01-01 PROCEDURE — 74011250637 HC RX REV CODE- 250/637: Performed by: INTERNAL MEDICINE

## 2019-01-01 PROCEDURE — 83690 ASSAY OF LIPASE: CPT

## 2019-01-01 PROCEDURE — HHS10103 EAR FOAM WRAP

## 2019-01-01 PROCEDURE — 74011250636 HC RX REV CODE- 250/636: Performed by: FAMILY MEDICINE

## 2019-01-01 PROCEDURE — 99285 EMERGENCY DEPT VISIT HI MDM: CPT

## 2019-01-01 PROCEDURE — G0299 HHS/HOSPICE OF RN EA 15 MIN: HCPCS

## 2019-01-01 PROCEDURE — 85045 AUTOMATED RETICULOCYTE COUNT: CPT

## 2019-01-01 PROCEDURE — 80048 BASIC METABOLIC PNL TOTAL CA: CPT

## 2019-01-01 PROCEDURE — HOSPICE MEDICATION HC HH HOSPICE MEDICATION

## 2019-01-01 PROCEDURE — 74011000250 HC RX REV CODE- 250: Performed by: STUDENT IN AN ORGANIZED HEALTH CARE EDUCATION/TRAINING PROGRAM

## 2019-01-01 PROCEDURE — 36415 COLL VENOUS BLD VENIPUNCTURE: CPT

## 2019-01-01 PROCEDURE — 96366 THER/PROPH/DIAG IV INF ADDON: CPT

## 2019-01-01 PROCEDURE — 87086 URINE CULTURE/COLONY COUNT: CPT

## 2019-01-01 PROCEDURE — 74011250636 HC RX REV CODE- 250/636: Performed by: STUDENT IN AN ORGANIZED HEALTH CARE EDUCATION/TRAINING PROGRAM

## 2019-01-01 PROCEDURE — 71045 X-RAY EXAM CHEST 1 VIEW: CPT

## 2019-01-01 PROCEDURE — 97110 THERAPEUTIC EXERCISES: CPT

## 2019-01-01 PROCEDURE — 84443 ASSAY THYROID STIM HORMONE: CPT

## 2019-01-01 PROCEDURE — 65660000000 HC RM CCU STEPDOWN

## 2019-01-01 PROCEDURE — 85027 COMPLETE CBC AUTOMATED: CPT

## 2019-01-01 PROCEDURE — 99284 EMERGENCY DEPT VISIT MOD MDM: CPT

## 2019-01-01 PROCEDURE — 77030012856

## 2019-01-01 PROCEDURE — 70551 MRI BRAIN STEM W/O DYE: CPT

## 2019-01-01 PROCEDURE — A6250 SKIN SEAL PROTECT MOISTURIZR: HCPCS

## 2019-01-01 PROCEDURE — 0651 HSPC ROUTINE HOME CARE

## 2019-01-01 PROCEDURE — 97162 PT EVAL MOD COMPLEX 30 MIN: CPT

## 2019-01-01 PROCEDURE — 80061 LIPID PANEL: CPT

## 2019-01-01 PROCEDURE — 3336500001 HSPC ELECTION

## 2019-01-01 PROCEDURE — 96360 HYDRATION IV INFUSION INIT: CPT

## 2019-01-01 PROCEDURE — 92526 ORAL FUNCTION THERAPY: CPT

## 2019-01-01 PROCEDURE — 71046 X-RAY EXAM CHEST 2 VIEWS: CPT

## 2019-01-01 PROCEDURE — 97116 GAIT TRAINING THERAPY: CPT

## 2019-01-01 PROCEDURE — 87186 SC STD MICRODIL/AGAR DIL: CPT

## 2019-01-01 PROCEDURE — 72110 X-RAY EXAM L-2 SPINE 4/>VWS: CPT

## 2019-01-01 PROCEDURE — 83605 ASSAY OF LACTIC ACID: CPT

## 2019-01-01 PROCEDURE — 70450 CT HEAD/BRAIN W/O DYE: CPT

## 2019-01-01 PROCEDURE — G0156 HHCP-SVS OF AIDE,EA 15 MIN: HCPCS

## 2019-01-01 PROCEDURE — 95816 EEG AWAKE AND DROWSY: CPT | Performed by: NURSE PRACTITIONER

## 2019-01-01 PROCEDURE — 73030 X-RAY EXAM OF SHOULDER: CPT

## 2019-01-01 PROCEDURE — 77030010545

## 2019-01-01 PROCEDURE — 96374 THER/PROPH/DIAG INJ IV PUSH: CPT

## 2019-01-01 PROCEDURE — 77030011256 HC DRSG MEPILEX <16IN NO BORD MOLN -A

## 2019-01-01 PROCEDURE — 97112 NEUROMUSCULAR REEDUCATION: CPT

## 2019-01-01 PROCEDURE — A6254 ABSORPT DRG <=16 SQ IN W/BDR: HCPCS

## 2019-01-01 PROCEDURE — 80307 DRUG TEST PRSMV CHEM ANLYZR: CPT

## 2019-01-01 PROCEDURE — 77010033678 HC OXYGEN DAILY

## 2019-01-01 PROCEDURE — 82728 ASSAY OF FERRITIN: CPT

## 2019-01-01 PROCEDURE — HHS10554 SHAMPOO/BODY WASH 8 OZ ALOE VESTA

## 2019-01-01 PROCEDURE — 85025 COMPLETE CBC W/AUTO DIFF WBC: CPT

## 2019-01-01 PROCEDURE — 83036 HEMOGLOBIN GLYCOSYLATED A1C: CPT

## 2019-01-01 PROCEDURE — 96361 HYDRATE IV INFUSION ADD-ON: CPT

## 2019-01-01 PROCEDURE — 0656 HSPC GENERAL INPATIENT

## 2019-01-01 PROCEDURE — A6260 WOUND CLEANSER ANY TYPE/SIZE: HCPCS

## 2019-01-01 PROCEDURE — 77030034850

## 2019-01-01 PROCEDURE — 96375 TX/PRO/DX INJ NEW DRUG ADDON: CPT

## 2019-01-01 PROCEDURE — G0155 HHCP-SVS OF CSW,EA 15 MIN: HCPCS

## 2019-01-01 PROCEDURE — 96365 THER/PROPH/DIAG IV INF INIT: CPT

## 2019-01-01 PROCEDURE — 74011250636 HC RX REV CODE- 250/636: Performed by: EMERGENCY MEDICINE

## 2019-01-01 PROCEDURE — 87077 CULTURE AEROBIC IDENTIFY: CPT

## 2019-01-01 PROCEDURE — 84100 ASSAY OF PHOSPHORUS: CPT

## 2019-01-01 PROCEDURE — 83540 ASSAY OF IRON: CPT

## 2019-01-01 PROCEDURE — A6216 NON-STERILE GAUZE<=16 SQ IN: HCPCS

## 2019-01-01 PROCEDURE — 82140 ASSAY OF AMMONIA: CPT

## 2019-01-01 PROCEDURE — 92507 TX SP LANG VOICE COMM INDIV: CPT

## 2019-01-01 PROCEDURE — A4927 NON-STERILE GLOVES: HCPCS

## 2019-01-01 PROCEDURE — 84145 PROCALCITONIN (PCT): CPT

## 2019-01-01 PROCEDURE — 65270000029 HC RM PRIVATE

## 2019-01-01 PROCEDURE — 93880 EXTRACRANIAL BILAT STUDY: CPT

## 2019-01-01 PROCEDURE — 82607 VITAMIN B-12: CPT

## 2019-01-01 PROCEDURE — 92610 EVALUATE SWALLOWING FUNCTION: CPT

## 2019-01-01 PROCEDURE — 74011000258 HC RX REV CODE- 258: Performed by: STUDENT IN AN ORGANIZED HEALTH CARE EDUCATION/TRAINING PROGRAM

## 2019-01-01 PROCEDURE — 97535 SELF CARE MNGMENT TRAINING: CPT

## 2019-01-01 PROCEDURE — 74011250637 HC RX REV CODE- 250/637: Performed by: EMERGENCY MEDICINE

## 2019-01-01 RX ORDER — CARBIDOPA AND LEVODOPA 25; 100 MG/1; MG/1
1.5 TABLET ORAL 2 TIMES DAILY
Status: DISCONTINUED | OUTPATIENT
Start: 2019-01-01 | End: 2019-01-01 | Stop reason: HOSPADM

## 2019-01-01 RX ORDER — HYDROMORPHONE HYDROCHLORIDE 5 MG/5ML
1 SOLUTION ORAL
Status: DISCONTINUED | OUTPATIENT
Start: 2019-01-01 | End: 2019-01-01 | Stop reason: HOSPADM

## 2019-01-01 RX ORDER — CARBIDOPA AND LEVODOPA 25; 100 MG/1; MG/1
1.5 TABLET ORAL 4 TIMES DAILY
Status: ON HOLD | COMMUNITY
Start: 2019-01-01 | End: 2019-01-01

## 2019-01-01 RX ORDER — ACETAMINOPHEN 325 MG/1
650 TABLET ORAL
Status: DISCONTINUED | OUTPATIENT
Start: 2019-01-01 | End: 2019-01-01 | Stop reason: HOSPADM

## 2019-01-01 RX ORDER — ENTACAPONE 200 MG/1
TABLET ORAL
Qty: 90 TAB | Refills: 3 | Status: SHIPPED | OUTPATIENT
Start: 2019-01-01 | End: 2019-01-01

## 2019-01-01 RX ORDER — SODIUM CHLORIDE 0.9 % (FLUSH) 0.9 %
5-40 SYRINGE (ML) INJECTION EVERY 8 HOURS
Status: DISCONTINUED | OUTPATIENT
Start: 2019-01-01 | End: 2019-01-01 | Stop reason: HOSPADM

## 2019-01-01 RX ORDER — ONDANSETRON 2 MG/ML
4 INJECTION INTRAMUSCULAR; INTRAVENOUS
Status: DISCONTINUED | OUTPATIENT
Start: 2019-01-01 | End: 2019-01-01 | Stop reason: HOSPADM

## 2019-01-01 RX ORDER — CARBIDOPA AND LEVODOPA 25; 100 MG/1; MG/1
1.5 TABLET ORAL 4 TIMES DAILY
Status: DISCONTINUED | OUTPATIENT
Start: 2019-01-01 | End: 2019-01-01

## 2019-01-01 RX ORDER — SODIUM CHLORIDE 9 MG/ML
75 INJECTION, SOLUTION INTRAVENOUS CONTINUOUS
Status: DISCONTINUED | OUTPATIENT
Start: 2019-01-01 | End: 2019-01-01 | Stop reason: HOSPADM

## 2019-01-01 RX ORDER — ACETAMINOPHEN 325 MG/1
650 TABLET ORAL
Status: DISCONTINUED | OUTPATIENT
Start: 2019-01-01 | End: 2019-01-01

## 2019-01-01 RX ORDER — HYDROMORPHONE HYDROCHLORIDE 1 MG/ML
0.2 INJECTION, SOLUTION INTRAMUSCULAR; INTRAVENOUS; SUBCUTANEOUS
Status: DISCONTINUED | OUTPATIENT
Start: 2019-01-01 | End: 2019-01-01 | Stop reason: HOSPADM

## 2019-01-01 RX ORDER — CARBIDOPA AND LEVODOPA 25; 100 MG/1; MG/1
1.5 TABLET ORAL 4 TIMES DAILY
Qty: 36 TAB | Refills: 0 | Status: SHIPPED | OUTPATIENT
Start: 2019-01-01 | End: 2019-01-01

## 2019-01-01 RX ORDER — FLUDROCORTISONE ACETATE 0.1 MG/1
0.1 TABLET ORAL DAILY
Status: DISCONTINUED | OUTPATIENT
Start: 2019-01-01 | End: 2019-01-01 | Stop reason: HOSPADM

## 2019-01-01 RX ORDER — SCOLOPAMINE TRANSDERMAL SYSTEM 1 MG/1
1 PATCH, EXTENDED RELEASE TRANSDERMAL
Status: DISCONTINUED | OUTPATIENT
Start: 2019-01-01 | End: 2019-01-01 | Stop reason: HOSPADM

## 2019-01-01 RX ORDER — NALOXONE HYDROCHLORIDE 0.4 MG/ML
0.4 INJECTION, SOLUTION INTRAMUSCULAR; INTRAVENOUS; SUBCUTANEOUS AS NEEDED
Status: DISCONTINUED | OUTPATIENT
Start: 2019-01-01 | End: 2019-01-01 | Stop reason: HOSPADM

## 2019-01-01 RX ORDER — CARBIDOPA AND LEVODOPA 50; 200 MG/1; MG/1
1 TABLET, EXTENDED RELEASE ORAL
Status: DISCONTINUED | OUTPATIENT
Start: 2019-01-01 | End: 2019-01-01 | Stop reason: HOSPADM

## 2019-01-01 RX ORDER — SODIUM CHLORIDE 0.9 % (FLUSH) 0.9 %
5-40 SYRINGE (ML) INJECTION AS NEEDED
Status: DISCONTINUED | OUTPATIENT
Start: 2019-01-01 | End: 2019-01-01 | Stop reason: HOSPADM

## 2019-01-01 RX ORDER — CARBIDOPA AND LEVODOPA 50; 200 MG/1; MG/1
1 TABLET, EXTENDED RELEASE ORAL
COMMUNITY
End: 2019-01-01

## 2019-01-01 RX ORDER — SODIUM CHLORIDE 0.9 % (FLUSH) 0.9 %
5-10 SYRINGE (ML) INJECTION AS NEEDED
Status: DISCONTINUED | OUTPATIENT
Start: 2019-01-01 | End: 2019-01-01 | Stop reason: HOSPADM

## 2019-01-01 RX ORDER — ONDANSETRON 4 MG/1
4 TABLET, ORALLY DISINTEGRATING ORAL
Status: DISCONTINUED | OUTPATIENT
Start: 2019-01-01 | End: 2019-01-01 | Stop reason: HOSPADM

## 2019-01-01 RX ORDER — ONDANSETRON 2 MG/ML
4 INJECTION INTRAMUSCULAR; INTRAVENOUS
Status: DISCONTINUED | OUTPATIENT
Start: 2019-01-01 | End: 2019-01-01 | Stop reason: SDUPTHER

## 2019-01-01 RX ORDER — LORAZEPAM 2 MG/ML
1 INJECTION INTRAMUSCULAR
Status: DISCONTINUED | OUTPATIENT
Start: 2019-01-01 | End: 2019-01-01 | Stop reason: HOSPADM

## 2019-01-01 RX ORDER — ACETAMINOPHEN 650 MG/1
650 SUPPOSITORY RECTAL
Status: DISCONTINUED | OUTPATIENT
Start: 2019-01-01 | End: 2019-01-01 | Stop reason: HOSPADM

## 2019-01-01 RX ORDER — HYDROCODONE BITARTRATE AND ACETAMINOPHEN 10; 325 MG/1; MG/1
1 TABLET ORAL
Status: DISCONTINUED | OUTPATIENT
Start: 2019-01-01 | End: 2019-01-01 | Stop reason: HOSPADM

## 2019-01-01 RX ORDER — CARBIDOPA AND LEVODOPA 25; 100 MG/1; MG/1
2 TABLET ORAL 2 TIMES DAILY
Status: DISCONTINUED | OUTPATIENT
Start: 2019-01-01 | End: 2019-01-01 | Stop reason: HOSPADM

## 2019-01-01 RX ORDER — ATORVASTATIN CALCIUM 20 MG/1
20 TABLET, FILM COATED ORAL DAILY
Status: DISCONTINUED | OUTPATIENT
Start: 2019-01-01 | End: 2019-01-01

## 2019-01-01 RX ORDER — ACETAMINOPHEN 500 MG
1000 TABLET ORAL
Status: COMPLETED | OUTPATIENT
Start: 2019-01-01 | End: 2019-01-01

## 2019-01-01 RX ORDER — ASPIRIN 81 MG/1
81 TABLET ORAL DAILY
COMMUNITY
End: 2019-01-01

## 2019-01-01 RX ORDER — MORPHINE SULFATE 4 MG/ML
2 INJECTION INTRAVENOUS
Status: DISCONTINUED | OUTPATIENT
Start: 2019-01-01 | End: 2019-01-01

## 2019-01-01 RX ORDER — ENOXAPARIN SODIUM 100 MG/ML
40 INJECTION SUBCUTANEOUS EVERY 24 HOURS
Status: DISCONTINUED | OUTPATIENT
Start: 2019-01-01 | End: 2019-01-01

## 2019-01-01 RX ORDER — SODIUM CHLORIDE 0.9 % (FLUSH) 0.9 %
5-40 SYRINGE (ML) INJECTION EVERY 8 HOURS
Status: DISCONTINUED | OUTPATIENT
Start: 2019-01-01 | End: 2019-01-01

## 2019-01-01 RX ORDER — MORPHINE SULFATE 4 MG/ML
10 INJECTION INTRAVENOUS
Status: DISCONTINUED | OUTPATIENT
Start: 2019-01-01 | End: 2019-01-01

## 2019-01-01 RX ORDER — HYDROCODONE BITARTRATE AND ACETAMINOPHEN 10; 325 MG/1; MG/1
1 TABLET ORAL
Qty: 14 TAB | Refills: 0 | Status: SHIPPED | OUTPATIENT
Start: 2019-01-01 | End: 2019-01-01

## 2019-01-01 RX ORDER — PROCHLORPERAZINE EDISYLATE 5 MG/ML
10 INJECTION INTRAMUSCULAR; INTRAVENOUS
Status: DISCONTINUED | OUTPATIENT
Start: 2019-01-01 | End: 2019-01-01 | Stop reason: HOSPADM

## 2019-01-01 RX ORDER — LORAZEPAM 2 MG/ML
1 CONCENTRATE ORAL
Status: DISCONTINUED | OUTPATIENT
Start: 2019-01-01 | End: 2019-01-01 | Stop reason: HOSPADM

## 2019-01-01 RX ORDER — GUAIFENESIN 100 MG/5ML
81 LIQUID (ML) ORAL DAILY
Status: SHIPPED | COMMUNITY
Start: 2019-01-01 | End: 2019-01-01

## 2019-01-01 RX ORDER — ATORVASTATIN CALCIUM 40 MG/1
40 TABLET, FILM COATED ORAL
Status: SHIPPED | COMMUNITY
Start: 2019-01-01 | End: 2019-01-01

## 2019-01-01 RX ORDER — SULFAMETHOXAZOLE AND TRIMETHOPRIM 800; 160 MG/1; MG/1
1 TABLET ORAL 2 TIMES DAILY
Qty: 6 TAB | Refills: 0 | Status: SHIPPED | OUTPATIENT
Start: 2019-01-01 | End: 2019-01-01

## 2019-01-01 RX ORDER — GLYCOPYRROLATE 0.2 MG/ML
0.2 INJECTION INTRAMUSCULAR; INTRAVENOUS
Status: DISCONTINUED | OUTPATIENT
Start: 2019-01-01 | End: 2019-01-01 | Stop reason: HOSPADM

## 2019-01-01 RX ORDER — ENOXAPARIN SODIUM 100 MG/ML
40 INJECTION SUBCUTANEOUS EVERY 24 HOURS
Status: DISCONTINUED | OUTPATIENT
Start: 2019-01-01 | End: 2019-01-01 | Stop reason: HOSPADM

## 2019-01-01 RX ORDER — GUAIFENESIN 100 MG/5ML
81 LIQUID (ML) ORAL DAILY
Status: DISCONTINUED | OUTPATIENT
Start: 2019-01-01 | End: 2019-01-01 | Stop reason: HOSPADM

## 2019-01-01 RX ORDER — FLUDROCORTISONE ACETATE 0.1 MG/1
0.1 TABLET ORAL DAILY
COMMUNITY
End: 2019-01-01

## 2019-01-01 RX ORDER — ATORVASTATIN CALCIUM 20 MG/1
40 TABLET, FILM COATED ORAL
Status: DISCONTINUED | OUTPATIENT
Start: 2019-01-01 | End: 2019-01-01 | Stop reason: HOSPADM

## 2019-01-01 RX ADMIN — FLUDROCORTISONE ACETATE 100 MCG: 0.1 TABLET ORAL at 09:38

## 2019-01-01 RX ADMIN — FLUDROCORTISONE ACETATE 100 MCG: 0.1 TABLET ORAL at 09:27

## 2019-01-01 RX ADMIN — CARBIDOPA AND LEVODOPA 1.5 TABLET: 25; 100 TABLET ORAL at 08:41

## 2019-01-01 RX ADMIN — HYDROCODONE BITARTRATE AND ACETAMINOPHEN 1 TABLET: 10; 325 TABLET ORAL at 23:14

## 2019-01-01 RX ADMIN — SODIUM CHLORIDE 75 ML/HR: 900 INJECTION, SOLUTION INTRAVENOUS at 16:06

## 2019-01-01 RX ADMIN — SODIUM CHLORIDE 75 ML/HR: 900 INJECTION, SOLUTION INTRAVENOUS at 23:56

## 2019-01-01 RX ADMIN — Medication 10 ML: at 23:20

## 2019-01-01 RX ADMIN — Medication 10 ML: at 22:04

## 2019-01-01 RX ADMIN — CEFTRIAXONE SODIUM 1 G: 1 INJECTION, POWDER, FOR SOLUTION INTRAMUSCULAR; INTRAVENOUS at 14:46

## 2019-01-01 RX ADMIN — SODIUM CHLORIDE 1000 ML: 900 INJECTION, SOLUTION INTRAVENOUS at 10:57

## 2019-01-01 RX ADMIN — CARBIDOPA AND LEVODOPA 1.5 TABLET: 25; 100 TABLET ORAL at 09:38

## 2019-01-01 RX ADMIN — CARBIDOPA AND LEVODOPA 1 TABLET: 50; 200 TABLET, EXTENDED RELEASE ORAL at 20:05

## 2019-01-01 RX ADMIN — PROCHLORPERAZINE EDISYLATE 10 MG: 5 INJECTION INTRAMUSCULAR; INTRAVENOUS at 02:46

## 2019-01-01 RX ADMIN — AMPICILLIN SODIUM AND SULBACTAM SODIUM 1.5 G: 1; .5 INJECTION, POWDER, FOR SOLUTION INTRAMUSCULAR; INTRAVENOUS at 12:53

## 2019-01-01 RX ADMIN — FLUDROCORTISONE ACETATE 100 MCG: 0.1 TABLET ORAL at 08:41

## 2019-01-01 RX ADMIN — ASPIRIN 81 MG 81 MG: 81 TABLET ORAL at 08:41

## 2019-01-01 RX ADMIN — HYDROCODONE BITARTRATE AND ACETAMINOPHEN 1 TABLET: 10; 325 TABLET ORAL at 20:07

## 2019-01-01 RX ADMIN — FLUDROCORTISONE ACETATE 100 MCG: 0.1 TABLET ORAL at 08:30

## 2019-01-01 RX ADMIN — Medication 10 ML: at 17:48

## 2019-01-01 RX ADMIN — CARBIDOPA AND LEVODOPA 2 TABLET: 25; 100 TABLET ORAL at 05:04

## 2019-01-01 RX ADMIN — CARBIDOPA AND LEVODOPA 2 TABLET: 25; 100 TABLET ORAL at 09:47

## 2019-01-01 RX ADMIN — ASPIRIN 81 MG 81 MG: 81 TABLET ORAL at 09:38

## 2019-01-01 RX ADMIN — CARBIDOPA AND LEVODOPA 1.5 TABLET: 25; 100 TABLET ORAL at 06:12

## 2019-01-01 RX ADMIN — CARBIDOPA AND LEVODOPA 1 TABLET: 50; 200 TABLET, EXTENDED RELEASE ORAL at 21:41

## 2019-01-01 RX ADMIN — Medication 10 ML: at 05:21

## 2019-01-01 RX ADMIN — MORPHINE SULFATE 2 MG: 4 INJECTION, SOLUTION INTRAMUSCULAR; INTRAVENOUS at 15:34

## 2019-01-01 RX ADMIN — CARBIDOPA AND LEVODOPA 1.5 TABLET: 25; 100 TABLET ORAL at 17:13

## 2019-01-01 RX ADMIN — SODIUM CHLORIDE 250 ML: 900 INJECTION, SOLUTION INTRAVENOUS at 10:57

## 2019-01-01 RX ADMIN — ENOXAPARIN SODIUM 40 MG: 40 INJECTION SUBCUTANEOUS at 23:14

## 2019-01-01 RX ADMIN — HYDROCODONE BITARTRATE AND ACETAMINOPHEN 1 TABLET: 10; 325 TABLET ORAL at 13:05

## 2019-01-01 RX ADMIN — ENOXAPARIN SODIUM 40 MG: 40 INJECTION SUBCUTANEOUS at 22:04

## 2019-01-01 RX ADMIN — ASPIRIN 81 MG 81 MG: 81 TABLET ORAL at 08:30

## 2019-01-01 RX ADMIN — CARBIDOPA AND LEVODOPA 1.5 TABLET: 25; 100 TABLET ORAL at 23:15

## 2019-01-01 RX ADMIN — ENOXAPARIN SODIUM 40 MG: 40 INJECTION SUBCUTANEOUS at 21:41

## 2019-01-01 RX ADMIN — CARBIDOPA AND LEVODOPA 1.5 TABLET: 25; 100 TABLET ORAL at 13:02

## 2019-01-01 RX ADMIN — CARBIDOPA AND LEVODOPA 1.5 TABLET: 25; 100 TABLET ORAL at 09:26

## 2019-01-01 RX ADMIN — CARBIDOPA AND LEVODOPA 1 TABLET: 50; 200 TABLET, EXTENDED RELEASE ORAL at 20:07

## 2019-01-01 RX ADMIN — Medication 10 ML: at 14:07

## 2019-01-01 RX ADMIN — SODIUM CHLORIDE 500 ML: 900 INJECTION, SOLUTION INTRAVENOUS at 12:04

## 2019-01-01 RX ADMIN — SODIUM CHLORIDE 75 ML/HR: 900 INJECTION, SOLUTION INTRAVENOUS at 05:00

## 2019-01-01 RX ADMIN — HYDROCODONE BITARTRATE AND ACETAMINOPHEN 1 TABLET: 10; 325 TABLET ORAL at 13:12

## 2019-01-01 RX ADMIN — CARBIDOPA AND LEVODOPA 1.5 TABLET: 25; 100 TABLET ORAL at 05:20

## 2019-01-01 RX ADMIN — Medication 10 ML: at 06:13

## 2019-01-01 RX ADMIN — ATORVASTATIN CALCIUM 40 MG: 20 TABLET, FILM COATED ORAL at 21:40

## 2019-01-01 RX ADMIN — PROCHLORPERAZINE EDISYLATE 10 MG: 5 INJECTION INTRAMUSCULAR; INTRAVENOUS at 05:01

## 2019-01-01 RX ADMIN — CARBIDOPA AND LEVODOPA 1.5 TABLET: 25; 100 TABLET ORAL at 13:09

## 2019-01-01 RX ADMIN — CARBIDOPA AND LEVODOPA 1.5 TABLET: 25; 100 TABLET ORAL at 14:25

## 2019-01-01 RX ADMIN — Medication 10 ML: at 21:42

## 2019-01-01 RX ADMIN — SODIUM CHLORIDE 1000 ML: 900 INJECTION, SOLUTION INTRAVENOUS at 10:56

## 2019-01-01 RX ADMIN — CARBIDOPA AND LEVODOPA 1.5 TABLET: 25; 100 TABLET ORAL at 17:48

## 2019-01-01 RX ADMIN — ASPIRIN 81 MG 81 MG: 81 TABLET ORAL at 13:09

## 2019-01-01 RX ADMIN — CARBIDOPA AND LEVODOPA 1.5 TABLET: 25; 100 TABLET ORAL at 17:50

## 2019-01-01 RX ADMIN — Medication 10 ML: at 13:03

## 2019-01-01 RX ADMIN — ACETAMINOPHEN 1000 MG: 500 TABLET ORAL at 14:59

## 2019-01-01 RX ADMIN — Medication 10 ML: at 06:27

## 2019-01-01 RX ADMIN — CARBIDOPA AND LEVODOPA 1.5 TABLET: 25; 100 TABLET ORAL at 06:27

## 2019-01-01 RX ADMIN — ENOXAPARIN SODIUM 40 MG: 40 INJECTION SUBCUTANEOUS at 21:39

## 2019-01-01 RX ADMIN — HYDROCODONE BITARTRATE AND ACETAMINOPHEN 1 TABLET: 10; 325 TABLET ORAL at 20:08

## 2019-01-01 RX ADMIN — CARBIDOPA AND LEVODOPA 1 TABLET: 50; 200 TABLET, EXTENDED RELEASE ORAL at 23:15

## 2019-01-01 RX ADMIN — Medication 10 ML: at 05:01

## 2019-01-01 RX ADMIN — SODIUM CHLORIDE 75 ML/HR: 900 INJECTION, SOLUTION INTRAVENOUS at 03:13

## 2019-01-01 RX ADMIN — HYDROCODONE BITARTRATE AND ACETAMINOPHEN 1 TABLET: 10; 325 TABLET ORAL at 21:40

## 2019-01-01 RX ADMIN — SODIUM CHLORIDE 75 ML/HR: 900 INJECTION, SOLUTION INTRAVENOUS at 01:11

## 2019-01-14 NOTE — PROGRESS NOTES
Chief Complaint Patient presents with  Memory Loss  Tremors Coordination of Care Questions 1. Have you been to the ER, urgent care clinic outside of [facility] since your last visit? No  
    Hospitalized since your last visit? No 
 
2. Have you seen or consulted any other health care providers outside of the [address] [place] [address] since your last visit? Include any pap smears or colon screening.  No

## 2019-01-14 NOTE — PROGRESS NOTES
Harry We-[address]  
[address]  [phone] Fax Chief Complaint Patient presents with  Memory Loss  Tremors Current Outpatient Medications Medication Sig Dispense Refill  carbidopa-levodopa (SINEMET)  mg per tablet 2.5 tabs  Tab 3  
 HYDROcodone-acetaminophen (NORCO)  mg tablet Take 1 Tab by mouth. No Known Allergies Social History Tobacco Use  Smoking status: Former Smoker Start date: 11/21/2010  Smokeless tobacco: Never Used Substance Use Topics  Alcohol use: Yes Alcohol/week: 0.6 oz Types: 1 Cans of beer per week Frequency: Never Comment: occ  Drug use: No  
 
Patient returns today for follow-up Parkinson's. Last visit we increased his Sinemet to a dose of 2.5 tablets 4 times daily. He has been tolerating that. He has not had any falls. Continues to shuffle. His wife says that she does not think the 2.5 tablets 4 times daily has made much of a difference. No dyskinesia. No hallucinations. No vivid dreams. No fever or chills. No chest pain. Continues to shuffle. He has seen a podiatrist who told him he has some fungus on his feet and they are doing a regimen for that. Review of systems Pertinent positives and negatives are as noted above otherwise comprehensive systems review is negative Examination Visit Vitals BP 94/46 (BP 1 Location: Right arm, BP Patient Position: Sitting) Pulse 79 Temp 98.1 °F (36.7 °C) (Oral) Resp 16 SpO2 97% Is awake alert oriented. In a wheelchair today. Has a cane. No icterus. Fluent. Follows all commands. No rest tremor. Cogwheeling at the wrist bilaterally. Quite bradykinetic. Arises from the chair with some assistance. Hunched posture. Shuffles and has a 10 step turn Impression/Plan Parkinsonism with continued decline despite increasing medication. Discussed at length. We will try adding Comtan with the first 3 doses of Sinemet. Discussed the ministration, potential side effects, potential benefits. Follow in about 4-6 weeks. Qiana Infante MD 
 
 
 
This note was created using voice recognition software. Despite editing, there may be syntax errors. This note will not be viewable in [address]
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
## 2019-01-17 NOTE — TELEPHONE ENCOUNTER
Jorden Simmons,     Can you please let the nursing know to have his family take him to the ER or contact [facility] if not able to control him or is overly agitated. He will need to be evaluated before any treatment. Thanks.   Dr. Mabel Crespo

## 2019-01-17 NOTE — TELEPHONE ENCOUNTER
[address] and was unable to speak with Cait Leach or a nurse manager. Will try again later.
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
## 2019-01-25 NOTE — TELEPHONE ENCOUNTER
Spoke with [address] [facility] after hours nurse. Pt was discharged on 1/16 from [facility] as medicare does not cover for long term ostomy bag care. Family is to care for the ostomy bag. Adminstrator to call on Monday for full story as pt's wife was verbally abuse as well.

## 2019-04-22 PROBLEM — R82.90 ABNORMAL URINE SEDIMENT: Status: ACTIVE | Noted: 2019-01-01

## 2019-04-22 PROBLEM — R53.81 DEBILITY: Status: ACTIVE | Noted: 2019-01-01

## 2019-04-22 PROBLEM — G20 PARKINSON DISEASE (HCC): Chronic | Status: ACTIVE | Noted: 2019-01-01

## 2019-04-22 PROBLEM — I10 HTN (HYPERTENSION): Chronic | Status: ACTIVE | Noted: 2019-01-01

## 2019-04-22 PROBLEM — G20 PARKINSON DISEASE (HCC): Status: ACTIVE | Noted: 2019-01-01

## 2019-04-22 PROBLEM — I10 HTN (HYPERTENSION): Status: ACTIVE | Noted: 2019-01-01

## 2019-04-22 PROBLEM — R40.20 LOSS OF CONSCIOUSNESS (HCC): Status: ACTIVE | Noted: 2019-01-01

## 2019-04-22 NOTE — ED PROVIDER NOTES
80 y.o. male with past medical history significant for lung CA, colon CA s/p colostomy, prostate CA s/p prostatectomy, Parkinson's disease, HTN, who presents to the ED via EMS accompanied by spouse, with chief complaint of left elbow pain. Spouse reports that patient woke up this morning with a leaking colostomy bag. She states that she and a home health nurse cleaned the patient, when the patient suddenly stopped talking. Spouse notes that patient was \"staring\" at the home health nurse and following commands such as squeezing her hands, but not talking back. She states that nurse called EMS. Spouse notes that patient has improved since episode, stating that he is now at his baseline. Pt states that he does not remember the staring spell, and does not know why he was brought into the ED today. He does complain of left elbow pain, but denies any other complaints. Pt specifically denies abdominal pain, dizziness or syncope. There are no other acute medical concerns at this time. Social hx: Tobacco use (former smoker, quit date: 11/21/10); Positive for EtOH use (0.6 oz/week); Negative for Illicit Drug use PCP: None Note written by Maria Victoria Cartwright, as dictated by Catie Ji MD 10:56 AM 
 
The history is provided by the EMS personnel, the patient and medical records. No  was used. Past Medical History:  
Diagnosis Date  Colon cancer ([address]  Hypertension  Lung cancer ([address]  Parkinson disease ([address]  Prostate cancer ([address] Past Surgical History:  
Procedure Laterality Date  HX COLOSTOMY  HX PROSTATECTOMY No family history on file. Social History Socioeconomic History  Marital status:  Spouse name: Not on file  Number of children: Not on file  Years of education: Not on file  Highest education level: Not on file Occupational History  Not on file Social Needs  Financial resource strain: Not on file  Food insecurity:  
  Worry: Not on file Inability: Not on file  Transportation needs:  
  Medical: Not on file Non-medical: Not on file Tobacco Use  Smoking status: Former Smoker Start date: 11/21/2010  Smokeless tobacco: Never Used Substance and Sexual Activity  Alcohol use: Yes Alcohol/week: 0.6 oz Types: 1 Cans of beer per week Frequency: Never Comment: occ  Drug use: No  
 Sexual activity: Never Lifestyle  Physical activity:  
  Days per week: Not on file Minutes per session: Not on file  Stress: Not on file Relationships  Social connections:  
  Talks on phone: Not on file Gets together: Not on file Attends Denominational service: Not on file Active member of club or organization: Not on file Attends meetings of clubs or organizations: Not on file Relationship status: Not on file  Intimate partner violence:  
  Fear of current or ex partner: Not on file Emotionally abused: Not on file Physically abused: Not on file Forced sexual activity: Not on file Other Topics Concern  Not on file Social History Narrative ** Merged History Encounter ** ALLERGIES: Patient has no known allergies. Review of Systems Gastrointestinal: Negative for abdominal pain. Musculoskeletal: Positive for arthralgias (left elbow). Neurological: Negative for dizziness and syncope. All other systems reviewed and are negative. Vitals:  
 04/22/19 1043 BP: 104/50 Pulse: (!) 53 Resp: 18 Temp: 97.4 °F (36.3 °C) SpO2: 95% Weight: 77.1 kg (170 lb) Height: 5' 10\" (1.778 m) Physical Exam  
Constitutional: He is oriented to person, place, and time. He appears well-developed and well-nourished. He appears ill (chronically). No distress. HENT:  
Head: Normocephalic and atraumatic. Eyes: Conjunctivae are normal.  
Neck: Neck supple. No tracheal deviation present. Cardiovascular: Normal rate, regular rhythm and normal heart sounds. Pulmonary/Chest: Effort normal and breath sounds normal. No respiratory distress. Abdominal: He exhibits no distension. There is no tenderness. Ostomy site in LLQ is clean, dry and intact after ostomy nurse management. Musculoskeletal: Normal range of motion. He exhibits no deformity. Neurological: He is alert and oriented to person, place, and time. No cranial nerve deficit. Skin: Skin is warm and dry. Psychiatric: He is slowed. Nursing note and vitals reviewed. Note written by Maria Victoria Ornelas, as dictated by Margarita Pineda MD 10:56 AM 
 
MDM 
  
80 y.o. male presents with staring spell where he wasn't talking to his wife this morning which concerned her. He also had a leaking colostomy bag that she did not feel comfortable fixing. He has completely resolved his symptoms on arrival, he has had no further episode, he is unsure why he was brought here and his only complaint is that his left lbow hurts with his \"normal pain\". No significant hematologic or metabolic abnormalities to explain symptoms. EKG reviewed by me is normal sinus rhythm and rate, without evidence of ST or T wave changes to suggest myocardial ischemia, no delta wave, no brugada, no prolonged QT to suggest arrhythmogenicity. Plan to follow up with PCP as needed and return precautions discussed for worsening or new concerning symptoms. Procedures ED EKG interpretation: 
Time: 10:49 Rhythm: normal sinus rhythm with PACs; Rate (approx.): 76 bpm; no STEMl; limited by artifact; Note written by Maria Victoria Ornelas, as dictated by Margarita Pineda MD 10:59 AM 
 
12:50 PM 
Patient's results have been reviewed with them.   Patient and/or family have verbally conveyed their understanding and agreement of the patient's signs, symptoms, diagnosis, treatment and prognosis and additionally agree to follow up as recommended or return to the Emergency Room should their condition change prior to follow-up. Discharge instructions have also been provided to the patient with some educational information regarding their diagnosis as well a list of reasons why they would want to return to the ER prior to their follow-up appointment should their condition change.

## 2019-04-22 NOTE — ED TRIAGE NOTES
Pt arrives via EMS, EMS states pt's wife called them because \"pt isn't doing well. \"  Pt states no pain, no dizziness, no complaints at this time.

## 2019-04-22 NOTE — ED NOTES
Verbal/Bedside report was given to Oliver Allison RN who will assume care of patient at this time. SBAR report consisted of ED summary, MAR, recent results, and additional pertinent information. Receiving nurse given opportunity to ask questions and seek clarifications. Receiving nurse aware of pending lab results and orders that are to be completed.

## 2019-04-22 NOTE — ED PROVIDER NOTES
80 y.o. male with past medical history significant for lung CA, colon CA s/p colostomy, prostate CA s/p prostatectomy, Parkinson's disease, HTN, who presents to the ED via EMS accompanied by spouse, with chief complaint of altered mental status and hypotension. Spouse reports that after patient was discharged from [facility] ED this afternoon, she fed him dinner of \"pot roast, potatoes, carrots and milk\" at home. She states that the patient only ate \"a little bit\" and after she cleaned the dishes, she found the patient \"drooling, with his head forward, eyes closed\" and \"not responsive\" to her shaking him or trying to open his eyes. Spouse reports that she called EMS. EMS personnel report that the spouse told them the patient \"isn't right\". They state that patient became hypotensive en route. Pt still complains of left elbow pain which he complained of during earlier visit, but does not know why he was brought back to the ED this evening. Spouse notes that the patient is taking Sinemet for his Parkinson's disease. There are no other acute medical concerns at this time. 
  
Social hx: Tobacco use (former smoker, quit date: 11/21/10); Positive for EtOH use (0.6 oz/week); Negative for Illicit Drug use PCP: None Full history, physical exam, and ROS unable to be obtained due to: patient and spouse are poor historians. Note written by Maria Victoria Dias, as dictated by Zack Ventura MD 7:00 PM 
 
The history is provided by the patient, medical records and a relative. History limited by: poor historian. No  was used. Past Medical History:  
Diagnosis Date  Colon cancer ([address]  Hypertension  Lung cancer ([address]  Parkinson disease ([address]  Prostate cancer ([address] Past Surgical History:  
Procedure Laterality Date  HX COLOSTOMY  HX PROSTATECTOMY No family history on file. Social History Socioeconomic History  Marital status:   
 Spouse name: Not on file  Number of children: Not on file  Years of education: Not on file  Highest education level: Not on file Occupational History  Not on file Social Needs  Financial resource strain: Not on file  Food insecurity:  
  Worry: Not on file Inability: Not on file  Transportation needs:  
  Medical: Not on file Non-medical: Not on file Tobacco Use  Smoking status: Former Smoker Start date: 11/21/2010  Smokeless tobacco: Never Used Substance and Sexual Activity  Alcohol use: Yes Alcohol/week: 0.6 oz Types: 1 Cans of beer per week Frequency: Never Comment: occ  Drug use: No  
 Sexual activity: Never Lifestyle  Physical activity:  
  Days per week: Not on file Minutes per session: Not on file  Stress: Not on file Relationships  Social connections:  
  Talks on phone: Not on file Gets together: Not on file Attends Protestant service: Not on file Active member of club or organization: Not on file Attends meetings of clubs or organizations: Not on file Relationship status: Not on file  Intimate partner violence:  
  Fear of current or ex partner: Not on file Emotionally abused: Not on file Physically abused: Not on file Forced sexual activity: Not on file Other Topics Concern  Not on file Social History Narrative ** Merged History Encounter ** ALLERGIES: Patient has no known allergies. Review of Systems Unable to perform ROS: Other (poor historian) Musculoskeletal: Positive for arthralgias (left elbow). Neurological: Positive for syncope. Vitals:  
 04/22/19 1920 04/22/19 1921 04/22/19 2013 04/22/19 2014 BP:      
Pulse:   74 72 Resp:   14 13 Temp:      
SpO2: 100% 100% 94% 95% Weight:      
Height:      
      
 
Physical Exam  
Constitutional: He appears well-developed and well-nourished. He appears ill (chronically). No distress. HENT:  
Head: Normocephalic and atraumatic. Eyes: Conjunctivae are normal.  
Neck: Neck supple. No tracheal deviation present. Cardiovascular: Normal rate and regular rhythm. Pulmonary/Chest: Effort normal. No respiratory distress. Abdominal: He exhibits no distension. Genitourinary:  
Genitourinary Comments: Indwelling bustos in place. Musculoskeletal: Normal range of motion. He exhibits no deformity. Neurological: He is alert. No cranial nerve deficit. Skin: Skin is warm and dry. Psychiatric: He is slowed. Nursing note and vitals reviewed. Note written by Maria Victoria Osullivan, as dictated by Pola Elliott MD 7:00 PM 
 
MDM 
  
80 y.o. male presents with return visit now for syncopal episode. I saw him earlier in the day for a staring spell. His wife now endorses that he slumped over and started drooling and wouldn't respond. He was hypotensive for EMS and has been recovering with IV fluids. Will recommend obs admission as this is second visit today for similar symptoms and had episode of hypotension on this visit. Hospitalist was consulted for admission and will see the patient in the emergency department. Procedures CONSULT NOTE: 
7:50 PM Pola Elliott MD spoke with Dr. Sudarshan Yan MD, Consult for [facility]. Discussed available diagnostic tests and clinical findings. Dr. Sander Benjamin will evaluate the patient for admission to the hospital. 
 
7:50 PM 
Patient is being admitted to the hospital.  The results of their tests and reasons for their admission have been discussed with them and/or available family. They convey agreement and understanding for the need to be admitted and for their admission diagnosis.

## 2019-04-22 NOTE — ED NOTES
Patient reports pain to left elbow and unable to urinate on own following catheter removal. Dr. Alvaro Rogers informed, tylenol ordered and bustos catheter with leg drainage bag to be inserted.

## 2019-04-22 NOTE — PROGRESS NOTES
4/22/2019 
1:22 PM 
Date of previous inpatient admission/ ED visit?  11/20/18 ED visit Ostomy complicatioons What brought the patient back to ED? Wife brought pt to Ed today stating \"pt isn't doing well\" per EMR p w/o complaint Did patient decline recommended services during last admission/ ED visit (if yes, what)? N/A Has patient seen a provider since their last inpatient admission/ED visit (if yes, when)? N/A 
 
CM Interventions: 
From previous inpatient admission/ED visit:  N/A From current inpatient admission/ED visit CM interventions:  provided pt's wife w/ list of pvt duty providers to assist w/ ostomy care, CM explained this is not a skilled service that is not covered by Medicare, agencies will not send out RN daily. Wife stating \"I want a retired nurse who will come and change the bag\" CM sent referral to [facility], additionally provided contact information for ostomy support group that may be able to assist. Referral to [facility] in [employer] Pt d/c'd to home. Hilary Castaneda

## 2019-04-22 NOTE — ED TRIAGE NOTES
Patient arrives via EMS. Per EMS patient's wife states patient \"isn't right\". Patient has been seen in the ED numerous times over the past couple weeks for same. EMS states when patient does not respond to wife at home when she asks him questions she believes he is having a stroke. Patient is alert and oriented at this time.

## 2019-04-23 NOTE — PROGRESS NOTES
[address] 
[address] 
[phone] Medical Progress Note NAME: Jose Alejandro Barrett :  1935 MRM:  [id number] Date/Time: 2019  2:04 PM 
 
  
Subjective: Chief Complaint:  F/u passing out ROS: 
(bold if positive, if negative) Tolerating PT  Tolerating Diet Objective:  
 
 
Vitals:  
 
 
  
Last 24hrs VS reviewed since prior progress note. Most recent are: 
 
Visit Vitals /74 (BP 1 Location: Left arm, BP Patient Position: At rest) Pulse 62 Temp 97.7 °F (36.5 °C) Resp 16 Ht 5' 10\" (1.778 m) Wt 77.1 kg (170 lb) SpO2 98% BMI 24.39 kg/m² SpO2 Readings from Last 6 Encounters:  
19 98% 19 99% 19 97% 18 95% 18 98% 18 95% Intake/Output Summary (Last 24 hours) at 2019 1404 Last data filed at 2019 0060 Gross per 24 hour Intake  Output 350 ml Net -350 ml Exam:  
 
Physical Exam: 
 
Gen:  elderly, in no acute distress HEENT:  Pink conjunctivae, PERRL, hearing intact to voice, moist mucous membranes Neck:  Supple, without masses, thyroid non-tender Resp:  No accessory muscle use, clear breath sounds without wheezes rales or rhonchi 
Card:  No murmurs, normal S1, S2 without thrills, bruits or peripheral edema Abd:  Soft, non-tender, non-distended, normoactive bowel sounds are present. Ostomy in place Musc:  No cyanosis or clubbing Skin:  No rashes or ulcers, skin turgor is good Neuro:  No focal deficits, follows commands appropriately Psych:  Poor insight, oriented to person, place and time, alert Medications Reviewed: (see below) Lab Data Reviewed: (see below) 
 
______________________________________________________________________ Medications:  
 
Current Facility-Administered Medications Medication Dose Route Frequency  aspirin chewable tablet 81 mg  81 mg Oral DAILY  0.9% sodium chloride infusion  75 mL/hr IntraVENous CONTINUOUS  
 sodium chloride (NS) flush 5-40 mL  5-40 mL IntraVENous Q8H  
 sodium chloride (NS) flush 5-40 mL  5-40 mL IntraVENous PRN  
 acetaminophen (TYLENOL) tablet 650 mg  650 mg Oral Q4H PRN  prochlorperazine (COMPAZINE) injection 10 mg  10 mg IntraVENous Q6H PRN  
 enoxaparin (LOVENOX) injection 40 mg  40 mg SubCUTAneous Q24H  carbidopa-levodopa (SINEMET)  mg per tablet 1.5 Tab  1.5 Tab Oral QID  carbidopa-levodopa ER (SINEMET CR)  mg per tablet 1 Tab  1 Tab Oral QHS  fludrocortisone (FLORINEF) tablet 100 mcg  0.1 mg Oral DAILY  HYDROcodone-acetaminophen (NORCO)  mg tablet 1 Tab  1 Tab Oral Q6H PRN Lab Review:  
 
Recent Labs  
  04/23/19 
0252 04/22/19 
1131 WBC 5.2 9.4 HGB 7.6* 10.0* HCT 24.9* 33.1*  
 266 Recent Labs  
  04/23/19 
0252 04/22/19 
1131  135* K 3.2* 3.5  100 CO2 27 30 GLU 81 94 BUN 19 18 CREA 0.48* 0.60* CA 7.6* 8.6 MG 1.6  --   
PHOS 2.7  --   
ALB  --  3.1*  
SGOT  --  6* ALT  --  <6* No components found for: [place] Assessment / Plan: Loss of consciousness: story is unclear and has changed multiple times from admit. Both wife and  are poor historians. Reviewed by neuro, stroke work up underway. TSH normal. Orthostatics ordered 
  
Anemia: Hg dropped overnight, unclear if dilutional or represents bleeding. Send iron profile and stool blood. Parkinson disease: continue Parkinsons meds, Neurology may need to adjust 
  
  HTN (hypertension): continue midodrine 
  
  Abnormal urine sediment (4/22/2019) 
            - does not appear infected but will send for culture 
            - may just be active sediment from dehydration 
  
  Debility: PT/OT to assess, wife may not be able to safely care for him 
            - Case Management consult Total time spent with patient: 30 Minutes Care Plan discussed with: Patient and Family Discussed:  Care Plan Prophylaxis:  Lovenox Disposition:  Home w/Family 
        
___________________________________________________ Attending Physician: Dora Mahan MD

## 2019-04-23 NOTE — PROGRESS NOTES
Problem: Mobility Impaired (Adult and Pediatric) Goal: *Acute Goals and Plan of Care (Insert Text) Description Physical Therapy Goals Initiated 4/23/2019 1. Patient will move from supine to sit and sit to supine  in bed with minimal assistance/contact guard assist within 7 day(s). 2.  Patient will transfer from bed to chair and chair to bed with minimal assistance/contact guard assist using the least restrictive device within 7 day(s). 3.  Patient will perform sit to stand with minimal assistance/contact guard assist within 7 day(s). 4.  Patient will ambulate with minimal assistance/contact guard assist for 100 feet with the least restrictive device within 7 day(s). Outcome: Progressing Towards Goal 
 PHYSICAL THERAPY EVALUATION Patient: Yunior Kessler (33 y.o. male) Date: 4/23/2019 Primary Diagnosis: Loss of consciousness ([facility] [address] [R40.20] Precautions:   Fall ASSESSMENT : 
Based on the objective data described below, the patient presents with admission due to episode of unresponsive staring and inability to follow commands/LOC with eyes open. Also with c/o of L elbow pain. Pt lives with his wife and has home care staff from 7:30am to 9:00pm daily for self care. PTA pt was transferring to a w/c daily and was receiving HHPT for gait training. Wife/pt reporting has had colostomy for last 4yrs and has hx of colon/lung/prostate CA and Parkinison's Dz. Wife stating that for the last 2yrs she has been waiting until nursing comes 2x/wk to change colostomy bag vs her performing??? Questioning wife's mental/physical abilities at this time. Wife stating that she realizes that she needs to hire someone at this point for ostomy care. Pt returning from testing in w/c. He is A&Ox4. L wrist droop noted without ability to extend. No c/o L elbow pain at this time, yet weakness may be due to injury? Both wife and pt stating that this is not a new occurrence?   Otherwise pt demonstrating symmetrical strength B although weak. B shoulder/prox UE use is very limited. Sit to stand via HHA with Mod Ax2. Gait of 5' to bed with same. Mod to Max Ax2 with sit to supine. Rehab at SNF or [facility] recommended with 24hr care. Patient will benefit from skilled intervention to address the above impairments. Patient?s rehabilitation potential is considered to be Good Factors which may influence rehabilitation potential include:  
? None noted ? Mental ability/status ? Medical condition ? Home/family situation and support systems ? Safety awareness 
? Pain tolerance/management 
? Other: PLAN : 
Recommendations and Planned Interventions: 
?           Bed Mobility Training             ? Neuromuscular Re-Education ? Transfer Training                   ? Orthotic/Prosthetic Training 
? Gait Training                         ? Modalities ? Therapeutic Exercises           ? Edema Management/Control ? Therapeutic Activities            ? Patient and Family Training/Education ? Other (comment): Frequency/Duration: Patient will be followed by physical therapy  5 times a week to address goals. Discharge Recommendations: Rehab at [facility] with 24hr care Further Equipment Recommendations for Discharge: tbd; has rollator and RW SUBJECTIVE:  
Patient stated ? I have Parkinson's.? OBJECTIVE DATA SUMMARY:  
HISTORY:   
Past Medical History:  
Diagnosis Date  
 'light-for-dates' infant with signs of fetal malnutrition Colon cancer ([facility] [address] Hypertension Lung cancer ([address] Parkinson disease ([address] Prostate cancer ([facility] [address] Past Surgical History:  
Procedure Laterality Date HX COLOSTOMY HX PROSTATECTOMY Prior Level of Function/Home Situation: see above Personal factors and/or comorbidities impacting plan of care: see above and below Home Situation Home Environment: Private residence # Steps to Enter: 0 One/Two Story Residence: One story Living Alone: No 
Support Systems: Home care staff, Spouse/Significant Other/Partner(7:30- 9pm daily) Patient Expects to be Discharged to[de-identified] Private residence Current DME Used/Available at Home: Cane, straight, Commode, bedside, Lift chair, Wheelchair, [place], rolling, Walker, rollator Tub or Shower Type: Shower EXAMINATION/PRESENTATION/DECISION MAKING:  
Critical Behavior: 
Neurologic State: Alert Orientation Level: Oriented X4 Cognition: Follows commands Safety/Judgement: Fall prevention, Decreased insight into deficits Hearing: Auditory Auditory Impairment: Hard of hearing, bilateral 
Skin:  IV; colostomy; bustos Edema: WNL Range Of Motion: 
AROM: Generally decreased, functional(Grossly B shoulders and L wrist extension) PROM: Generally decreased, functional(B shoulders and L wrist ext) Strength:   
Strength: Generally decreased, functional(B sh 2-/5 and L wrist extension 1/5) Tone & Sensation:  
Tone: Abnormal 
  
  
  
  
Sensation: Intact Coordination: 
Coordination: Generally decreased, functional 
Vision:  
  
Functional Mobility: 
Bed Mobility: 
Rolling: Moderate assistance Sit to Supine: Maximum assistance; Moderate assistance;Assist x2 Scooting: Moderate assistance;Assist x2 Transfers: 
Sit to Stand: Moderate assistance;Assist x2 Stand to Sit: Minimum assistance;Assist x2 Balance:  
Sitting: Intact; High guard Standing: Impaired; With support Standing - Static: Constant support; Fair 
Standing - Dynamic : Poor;Constant support Ambulation/Gait Training: 
Distance (ft): 5 Feet (ft) Assistive Device: Gait belt(HHA due to L wrist weakness) Base of Support: Narrowed Speed/Daphne: Slow;Shuffled;Pace decreased (<100 feet/min) Step Length: Right shortened;Left shortened Functional Measure: 
Barthel Index: 
Bathin Bladder: 0(bustos) Bowels: 0(colostomy) Groomin Dressin Feedin Mobility: 0 Stairs: 5 Toilet Use: 5 Transfer (Bed to Chair and Back): 5 Total: 25/100 Percentage of impairment  
0% 1-19% 20-39% 40-59% 60-79% 80-99% 100% Barthel Score 0-100 100 99-80 79-60 59-40 20-39 1-19 
 0 The Barthel ADL Index: Guidelines 1. The index should be used as a record of what a patient does, not as a record of what a patient could do. 2. The main aim is to establish degree of independence from any help, physical or verbal, however minor and for whatever reason. 3. The need for supervision renders the patient not independent. 4. A patient's performance should be established using the best available evidence. Asking the patient, friends/relatives and nurses are the usual sources, but direct observation and common sense are also important. However direct testing is not needed. 5. Usually the patient's performance over the preceding 24-48 hours is important, but occasionally longer periods will be relevant. 6. Middle categories imply that the patient supplies over 50 per cent of the effort. 7. Use of aids to be independent is allowed. Meena Cline., Barthel, D.W. ([id number]). Functional evaluation: the Barthel Index. [facility] (14)2. JOHANA Monterroso, Yara Cortez., [facility]., [facility], [address] [place] [address] (). Measuring the change indisability after inpatient rehabilitation; comparison of the responsiveness of the Barthel Index and Functional [place] Measure. Journal of Neurology, Neurosurgery, and Psychiatry, 66(4), 911-770. Gisella Thomas, N.J.A, ROSSY Ya.TETE, & Liz Henderson MJasonA. (2004.) Assessment of post-stroke quality of life in cost-effectiveness studies: The usefulness of the Barthel Index and the EuroQoL-5D. [facility], 13, 356-03 Physical Therapy Evaluation Charge Determination History Examination Presentation Decision-Making MEDIUM Complexity :3 comorbidities / personal factors will impact the outcome/ POC  MEDIUM Complexity : 3 Standardized tests and measures addressing body structure, function, activity limitation and / or participation in recreation  MEDIUM Complexity : Evolving with changing characteristics  Other outcome measures barthel  HIGH Based on the above components, the patient evaluation is determined to be of the following complexity level: MEDIUM Pain: 
Pain Scale 1: Numeric (0 - 10) Pain Intensity 1: 6 Pain Location 1: Generalized Pain Intervention(s) 1: Medication (see MAR) Activity Tolerance:  
fair Please refer to the flowsheet for vital signs taken during this treatment. After treatment:  
?         Patient left in no apparent distress sitting up in chair ? Patient left in no apparent distress in bed 
? Call bell left within reach ? Nursing notified ? Caregiver present ? Bed alarm activated COMMUNICATION/EDUCATION:  
The patient?s plan of care was discussed with: Occupational Therapist and Registered Nurse. ?         Fall prevention education was provided and the patient/caregiver indicated understanding. ? Patient/family have participated as able in goal setting and plan of care. ?         Patient/family agree to work toward stated goals and plan of care. ?         Patient understands intent and goals of therapy, but is neutral about his/her participation. ? Patient is unable to participate in goal setting and plan of care. Thank you for this referral. 
Raoul Hollis, PT Time Calculation: 35 mins

## 2019-04-23 NOTE — PROGRESS NOTES
Problem: Self Care Deficits Care Plan (Adult) Goal: *Acute Goals and Plan of Care (Insert Text) Description Occupational Therapy Goals Initiated 4/23/2019 1. Patient will perform grooming with supervision/set-up within 7 day(s). 2.  Patient will perform upper body dressing with minimal assistance/contact guard assist within 7 day(s). 3.  Patient will perform lower body dressing with moderate assistance  within 7 day(s). 4.  Patient will perform functional transfers with minimal assistance/contact guard assist within 7 day(s). 5.  Patient will participate in upper extremity therapeutic exercise/activities with supervision/set-up for 5 minutes within 7 day(s). Outcome: Progressing Towards Goal 
 OCCUPATIONAL THERAPY EVALUATION Patient: Obi Solares (25 y.o. male) Date: 4/23/2019 Primary Diagnosis: Loss of consciousness ([facility] [address] [R40.20] Precautions:   Fall ASSESSMENT : 
Based on the objective data described below, the patient presents with hospital admission secondary to unresponsive staring episode with inability to follow commands. Patient received in wheelchair coming back from test. Agreeable to OT evaluation and assist to return to bed. Patient wife present and reports caregiver assist at home for ADL tasks. Patient requires assist with ADL tasks and was receiving [facility] services. Wife reports RN in 2 x per week to change colostomy bag, and when asked why she does not assist, wife replies she cannot. She explains colostomy bag changed only 2x per week with RN. Patient noted with left wrist swelling, and weakness. He is unable to extend wrist or digits fully. He demonstrates poor shoulder ROM bilaterally. Patient requires mod assist x 2 for sit to stand and transfers with mod assist x 2 to bed. Patient will benefit from continued OT services. Patient will benefit from skilled intervention to address the above impairments. Patient?s rehabilitation potential is considered to be Fair Factors which may influence rehabilitation potential include: ? None noted ? Mental ability/status ? Medical condition ? Home/family situation and support systems ? Safety awareness ? Pain tolerance/management ? Other: PLAN : 
Recommendations and Planned Interventions: 
?               Self Care Training                  ? Therapeutic Activities ? Functional Mobility Training    ? Cognitive Retraining 
? Therapeutic Exercises           ? Endurance Activities ? Balance Training                   ? Neuromuscular Re-Education ? Visual/Perceptual Training     ? Home Safety Training 
? Patient Education                 ? Family Training/Education ? Other (comment): Frequency/Duration: Patient will be followed by occupational therapy 5 times a week to address goals. Discharge Recommendations:  [facility] with 24hr supervision Further Equipment Recommendations for Discharge: TBD SUBJECTIVE:  
Patient stated ? they are useless. ? When asked how are his legs OBJECTIVE DATA SUMMARY:  
HISTORY:  
Past Medical History:  
Diagnosis Date  
 'light-for-dates' infant with signs of fetal malnutrition Colon cancer ([facility] [address] Hypertension Lung cancer ([facility] [address] Parkinson disease ([facility] [address] Prostate cancer ([facility] [address] Past Surgical History:  
Procedure Laterality Date HX COLOSTOMY HX PROSTATECTOMY Prior Level of Function/Environment/Context: see above Occupations in which the patient is/was successful, what are the barriers preventing that success:  
Performance Patterns (routines, roles, habits, and rituals):  
Personal Interests and/or values: Expanded or extensive additional review of patient history:  
 
Home Situation Home Environment: Private residence # Steps to Enter: 0 One/Two Story Residence: One story Living Alone: No 
Support Systems: Home care staff, Spouse/Significant Other/Partner(7:30- 9pm daily) Patient Expects to be Discharged to[de-identified] Private residence Current DME Used/Available at Home: Cane, straight, Commode, bedside, Lift chair, Wheelchair, [address], rolling, Walker, rollator Tub or Shower Type: Shower Hand dominance: Right EXAMINATION OF PERFORMANCE DEFICITS: 
Cognitive/Behavioral Status: 
Neurologic State: Alert Orientation Level: Oriented X4 Cognition: Follows commands Perception: Appears intact Perseveration: No perseveration noted Safety/Judgement: Fall prevention;Decreased insight into deficits Skin: intact as seen Edema: none noted Hearing: Auditory Auditory Impairment: Hard of hearing, bilateral 
 
Vision/Perceptual:   
    
    
    
  
    
    
  
Range of Motion: 
AROM: Generally decreased, functional(Grossly B shoulders and L wrist extension) PROM: Generally decreased, functional(B shoulders and L wrist ext) Strength: 
Strength: Generally decreased, functional(B sh 2-/5 and L wrist extension 1/5) Coordination: 
Coordination: Generally decreased, functional 
    
  
Tone & Sensation: 
Tone: Abnormal 
Sensation: Intact Balance: 
Sitting: Intact; High guard Standing: Impaired; With support Standing - Static: Constant support; Fair 
Standing - Dynamic : Poor;Constant support Functional Mobility and Transfers for ADLs: 
Bed Mobility: 
Rolling: Moderate assistance Sit to Supine: Maximum assistance; Moderate assistance;Assist x2 Scooting: Moderate assistance;Assist x2 Transfers: 
Sit to Stand: Moderate assistance;Assist x2 Stand to Sit: Minimum assistance;Assist x2 Bed to Chair: Moderate assistance;Assist x2 
 Toilet Transfer : Minimum assistance;Assist x2; Additional time(BSC ) ADL Assessment: 
Feeding: Setup Oral Facial Hygiene/Grooming: Contact guard assistance Bathing: Moderate assistance Upper Body Dressing: Moderate assistance Lower Body Dressing: Maximum assistance Toileting: Total assistance(colostomy and bustos ) ADL Intervention and task modifications: 
  
 
  
 
  
 
  
 
  
 
  
 
  
 
Cognitive Retraining Safety/Judgement: Fall prevention;Decreased insight into deficits Therapeutic Exercise: 
 
 
Functional Measure: 
Barthel Index: 
Bathin Bladder: 0 Bowels: 0 Groomin Dressin Feedin Mobility: 0 Stairs: 5 Toilet Use: 0 Transfer (Bed to Chair and Back): 5 Total: 20/100 Percentage of impairment  
0% 1-19% 20-39% 40-59% 60-79% 80-99% 100% Barthel Score 0-100 100 99-80 79-60 59-40 20-39 1-19 
 0 The Barthel ADL Index: Guidelines 1. The index should be used as a record of what a patient does, not as a record of what a patient could do. 2. The main aim is to establish degree of independence from any help, physical or verbal, however minor and for whatever reason. 3. The need for supervision renders the patient not independent. 4. A patient's performance should be established using the best available evidence. Asking the patient, friends/relatives and nurses are the usual sources, but direct observation and common sense are also important. However direct testing is not needed. 5. Usually the patient's performance over the preceding 24-48 hours is important, but occasionally longer periods will be relevant. 6. Middle categories imply that the patient supplies over 50 per cent of the effort. 7. Use of aids to be independent is allowed. Link ., Barthel, D.W. ([id number]). Functional evaluation: the Barthel Index. [address] [facility] (14)2.  
Vidya Carpio james JOHANA Ho, Jarrett Castellanos., Chandan Gustafson., Paty William. (1999). Measuring the change indisability after inpatient rehabilitation; comparison of the responsiveness of the Barthel Index and Functional [place] Measure. Journal of Neurology, Neurosurgery, and Psychiatry, 66(4), 518-355. JYOTHI Cervantes, BLACK Ya, & Tere Danielson M.A. (2004.) Assessment of post-stroke quality of life in cost-effectiveness studies: The usefulness of the Barthel Index and the EuroQoL-5D. [facility], 13, 624-61 Occupational Therapy Evaluation Charge Determination History Examination Decision-Making LOW Complexity : Brief history review  LOW Complexity : 1-3 performance deficits relating to physical, cognitive , or psychosocial skils that result in activity limitations and / or participation restrictions  LOW Complexity : No comorbidities that affect functional and no verbal or physical assistance needed to complete eval tasks Based on the above components, the patient evaluation is determined to be of the following complexity level: LOW Pain: 
Pain Scale 1: Numeric (0 - 10) Pain Intensity 1: 3 Pain Location 1: Generalized Pain Intervention(s) 1: Declines;Repositioned Activity Tolerance: VSS Please refer to the flowsheet for vital signs taken during this treatment. After treatment:  
? Patient left in no apparent distress sitting up in chair ? Patient left in no apparent distress in bed 
? Call bell left within reach ? Nursing notified ? Caregiver present ? Bed alarm activated COMMUNICATION/EDUCATION:  
The patient?s plan of care was discussed with: Physical Therapist and Registered Nurse. 
? Home safety education was provided and the patient/caregiver indicated understanding. ? Patient/family have participated as able in goal setting and plan of care. ? Patient/family agree to work toward stated goals and plan of care. ? Patient understands intent and goals of therapy, but is neutral about his/her participation. ? Patient is unable to participate in goal setting and plan of care. This patient?s plan of care is appropriate for delegation to HARI. Thank you for this referral. 
Live Anaya, OTR/L Time Calculation: 25 mins

## 2019-04-23 NOTE — WOUND CARE
Wound care consult: 
Initial visit for skin and wound assessment present on admission. Assisted to chair with minimal assistance. 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 5x-. 0.2 cm full thickness, pink and white base with rolled edges, no drainage. Present on admission Left buttock- 2x2 cm partial thickness skin loss over the fatty area of buttock. Red serous drainage, heather wound intact. LLQ colostomy appliance intact, semi formed stool, stoma pink and moist. Heels and elbows intact, no areas of discoloration. Blanching Left elbow resolving area of discoloration, less than 1x1 cm- skin intact and blanching, present on admission- unable to discern etiology pressure vs skin tear. Left heel- resolving dry scab from history of pressure injury- dry tan scabbed area with peeling skin present on admission Treatment 
mepilex applied to left elbow and sacral area. Low air loss bed obtained for patient Recommendations/Plan Routine ostomy care- wound care orders per Dr Saleem Kumar Low air loss surface- off loading cushion for chair Turn, reposition every 2 hours as tolerated, float heels, place in prevalon boots as tolerated. Incontinent care with comfort shields. Apply Zinc to all open areas, moisture barrier as needed. Will follow, reconsult as needed.  
Paul Florence

## 2019-04-23 NOTE — PROGRESS NOTES
Nutrition Assessment: 
 
RECOMMENDATIONS/INTERVENTION(S):  
Continue Cardiac diet Monitor PO intakes, weight, electrolytes- K+ 3.2 Monitor POC- placement or home ASSESSMENT:  
4/23: 80 yr old male admitted for loss of consciousness. PMhx: Colon cancer s/p colostomy, Hypertension, Lung cancer, Parkinson disease, and Prostate cancer. Consult received for GNM. Pt on Cardiac diet. Pt was upset this morning. Pt did eat breakfast. Per chart, weight has been stable. No known food allergies. No n/v, no c/d. Hx of \"minor\" chew/swallow difficulties. Check if SLP needs to assess pt. Monitor ostomy output, weight, GI status. Add Ensure Enlive once per day for nutrition support. K 3.2, Cr low. SUBJECTIVE/OBJECTIVE:  
Diet Order: Cardiac 
% Eaten:  No data found. Pertinent Medications: [x] Reviewed Labs reviewed:  [x] Anthropometrics: Height: 5' 10\" (177.8 cm) Weight: 77.1 kg (170 lb) IBW (%IBW):   ( ) UBW (%UBW):   (  %) BMI: Body mass index is 24.39 kg/m². This BMI is indicative of: 
 
 [] Underweight    [x] Normal    [] Overweight    []  Obesity    []  Extreme Obesity (BMI>40) Estimated Nutrition Needs (Based on): 1907 Kcals/day , 62 g(-77g/day(0.8-1.0g/kg)) Protein Carbohydrate: At Least 130 g/day  Fluids: 1900 mL/day (1mL/kg rounded to 50 mL) Last BM: 4/23- ostomy  [x]Active     []Hyperactive  []Hypoactive       [] Absent   BS Skin:    [x] Intact   [] Incision  [] Breakdown   [] DTI   [] Tears/Excoriation/Abrasion  []Edema [] Other: Wt Readings from Last 30 Encounters:  
04/23/19 77.1 kg (170 lb) 04/22/19 77.1 kg (170 lb)  
11/21/18 74.4 kg (164 lb)  
11/20/18 79.4 kg (175 lb) NUTRITION DIAGNOSES:  
Problem:  No nutritional diagnosis at this time Etiology: related to Signs/Symptoms: as evidenced by NUTRITION INTERVENTIONS: 
              General nutrition management. GOAL:  
 Pt will consume >50% of meals within 3-5 days Cultural, Samaritan, or Ethnic Dietary Needs: None LEARNING NEEDS (Diet, Food/Nutrient-Drug Interaction):  
 [x] None Identified 
 [] Identified and Education Provided/Documented 
 [] Identified and Pt declined/was not appropriate [x] Interdisciplinary Care Plan Reviewed/Documented  
 [x] Discharge Needs: Cardiac diet 
 [] No Nutrition Related Discharge Needs NUTRITION RISK:  
Pt Is At Nutrition Risk  [x] No Nutrition Risk Identified  [] PT SEEN FOR:  
 []  MD Consult: []Calorie Count []Diabetic Diet Education []Diet Education []Electrolyte Management []General Nutrition Management and Supplements []Management of Tube Feeding []TPN Recommendations [x]  RN Referral:  [x]MST score >=2 
   []Enteral/Parenteral Nutrition PTA []Pregnant: Gestational DM or Multigestation  
              [] Pressure Ulcer 
   
[]  Low BMI      []  Length of Stay       [] Dysphagia Diet     [] Ventilator      [] Follow-Up Previous Recommendations: 
 [] Implemented          [] Not Implemented          [x] Not Applicable Previous Goal: 
 [] Met              [] Progressing Towards Goal              [] Not Progressing Towards Goal   [x] Not Applicable Qian Ayala RD Pager: [phone] Office: [phone]

## 2019-04-23 NOTE — PHYSICIAN ADVISORY
Letter of Status Determination:  
Recommend hospitalization status upgraded from OBSERVATION  to INPATIENT  Status Pt Name:  Abby Grossman MR#  
KRISTIAN # [id number] / 
[id number] Payor: Yudith Dear / Plan: [address] / Product Type: Medicare /   
MICHELLE#  [id number] Room and Hospital  522/01  @ [facility] Hospitalization date  4/22/2019  6:52 PM  
Current Attending Physician  Alric Leventhal, MD  
Principal diagnosis  Loss of consciousness ([facility] [address] [R40.20] Clinicals  80 y.o. y.o  male hospitalized with above diagnosis The pt has had reported multiple episodes of syncope like episodes. CVA workup are in progress. Pt remains ? altered. His performance with therapist was poor. His in hospital acute care is expected to exceed two midnights for appropriate and necessary medical care. [place] ([facility]) criteria Does  NOT apply STATUS DETERMINATION  This patient is at high risk of adverse events and deterioration based on documented clinical data, comorbid conditions and current acute care course. Mr. Abby Grossman is expected to meet Inpatient Admission status criteria in accordance with CMS regulation Section 43 .3. Specifically, due to medical necessity the patient's stay is expected to exceed Two Midnights. It is our recommendation that this patient's hospitalization status should be upgraded from  OBSERVATION to INPATIENT status. The final decision of the patient's hospitalization status depends on the attending physician's judgment. Additional comments Payor: Yudith Dear / Plan: [address] / Product Type: Medicare /   
  
 
Imani Blake MD MPH FACP Cell: [phone] Physician Advisor [address]  
   
 Cell  [phone]   
 
 
[phone] Liz Ball

## 2019-04-23 NOTE — CONSULTS
Palliative Medicine Consult Patient Name: Pilar Ortez YOB: 1935 Date of Initial Consult: 4/23/2019 Reason for Consult: Goals of Care Discussion Requesting Provider: Dr. Taisha Godoy Primary Care Physician: Wade Estrella MD 
  
 SUMMARY:  
Pilar Ortez is a 80 y. o. with a past history of Lung CA, Colon CA s/p colostomy, prostate CA s/p prostatectomy, Pakinsons Disease, HTN, Chronic left elbow pain, who was admitted on 4/22/2019 from home with a diagnosis of AMS. Current medical issues leading to Palliative Medicine involvement include: Goals of Care discussion Patient presented to ER x2 the day of the admission. Initially he presented to ER  w/ cc of chronic left elbow pain and leaking colostomy bag. Patients wife who was accompanying patient also reported that [facility] nurse was performing personal care earlier that morning, when patient suddenly stopped talking and had a vacant stare, but was still able to follow simple commands. Spouse also reported that the patient has improved between that time and the time he arrived in ER, more alert, labs and EKG were ontained, patient was discharged home. However, later that evening patient presented to ER a second time, now reporting that patient had suddenly slumped over, had LOC and was drooling, x 2 episodes. Patient admitted. Course of hospitalization: Neurology consulted, results of various testing on 4/23 still pending, including: EEG, ECHO, MRI and US carotid Patient last seen at [facility] in February 2019 and noted to have progressive decline. PALLIATIVE DIAGNOSES:  
1. Advanced Care Planning discussion 2. Goals of care Discussion 3. AMS, unspecified 4. Weakness, generalized PLAN:  
1. Completed review of patients medical records , including medical documentation, vitals, MARs and results of labs and other diagnostics.  Palliative KENNETH Bertrand and myself met with the patient and his wife, during initial part of visit transport arrived to take patient to imaging. Spent time discussing her understanding of hospitalization as well as her concerns moving forward. We also discussed psycho social hx as well as baseline cognitive and functional status. Patient returned after imaging, wife had left for the day. Had opportunity to speak with him, assess his understanding of hospitalization, he wasoriented 2. Advanced Care Planning Discussion- Patient has a Full Code status. He does not have an AMD in his MR. Patient is , his wife Jie Sands, [phone], legal NOK. · Patients wife stated that patient completed and AMD type document and she is his mPOA. · We discussed code status, patients wife stated that they had both agreed a long time ago that they would not want CPR. I explained that at this time, Patient was a Full code, she stated that she wanted to talk to him more about it, did not want to change code status at this time. Later when I had chance to speak with patient he stated that he does not think he wants CPR, he agreed to talk with his wife before changing his code status. 3. Goals of Care Discussion- wife ultimately wants patient to go home, but she is in agreement to him going to facility for therapy, she hopes that he gets stronger, so that it makes it easier for her to care for him. Wife acknowledges that she needs to have more help with his care at home, currently she has an aide that comes in several hours in the morning to get the patient cleaned up and out of bed. Patient is agreeable to SNF for therapy. · Discussed possibility of [facility] home based primary care services, wife was in agreement to referral. Garret Denny sent referral to  [facility],  
· Palliative will f/u with [facility] liaison 4/24 to see if he would be eligible for one of the home based programs.   
4. AMS, Unspecified- patient w/ baseline parkinsons, but that has had new onset slurred speech. his wife also reported that he had been forgetful, but that the week before hospitalization he had become progressively worse. Patient not at baseline per wife. Patients speech was clear at the time of visit, he is somewhat quiet, mostly just answering questions, he also was oriented x3, understood hospitalization, though he does not recall events leading up to coming to hospital. 
5. Debility- patient with parkinsons a progressive disease, PTA he had been bed to wheel chair, able to weight bare and transfer with increasing amount of assistance. Patient has a hospital bed and wheel chair. Patient working with PT, requiring mod to max assist w/ transfers as well as sit to stand. Will likely benefit from continued therapy. 6. Initial consult note routed to primary continuity provider 7. Communicated plan of care with: Palliative IDT 
 
 
 GOALS OF CARE / TREATMENT PREFERENCES:  
[====Goals of Care====] GOALS OF CARE: 
Patient/Health Care Proxy Stated Goals: Prolong life TREATMENT PREFERENCES:  
Code Status: Full Code Advance Care Planning: 
Advance Care Planning 4/23/2019 Patient's Healthcare Decision Maker is: Verbal statement (Legal Next of Kin remains as decision maker) Confirm Advance Directive Yes, not on file Patient Would Like to Complete Advance Directive - Medical Interventions: Limited additional interventions Other Instructions:  
Artificially Administered Nutrition: (did not address) The palliative care team has discussed with patient / health care proxy about goals of care / treatment preferences for patient. 
[====Goals of Care====] HISTORY:  
 
History obtained from: medical record/ wife CHIEF COMPLAINT: denies HPI/SUBJECTIVE: The patient is:  
[x] Verbal and participatory [] Non-participatory due to:  
Patient reported pain in right elbow, stated it is chronic, see palliative ESAS assessment from 4/23/19. Patient denies nausea/vomiting, reports [place], no other complaints 4/23- ECHO, EEG, US carotid and MRI all pending results, neuro consulted, completed initial today Clinical Pain Assessment (nonverbal scale for severity on nonverbal patients):  
Clinical Pain Assessment Severity: 2 Location: right elbow Character: sore Duration: years Effect: hurts Factors: position Frequency: chronic Adult Nonverbal Pain Scale Face: No particular expression or smile Activity (Movement): Lying quietly, normal position Guarding: Lying quietly, no positioning of hands over areas of body Physiology (Vital Signs): Stable vital signs Respiratory: Baseline RR/SpO2 compliant with ventilator Total Score: 0 
 
 
 FUNCTIONAL ASSESSMENT:  
 
Palliative Performance Scale (PPS): PPS: 40 PSYCHOSOCIAL/SPIRITUAL SCREENING:  
 
Advance Care Planning: 
Advance Care Planning 4/23/2019 Patient's Healthcare Decision Maker is: Verbal statement (Legal Next of Kin remains as decision maker) Confirm Advance Directive Yes, not on file Patient Would Like to Complete Advance Directive - Any spiritual / Christian concerns: 
[] Yes /  [x] No 
 
Caregiver Burnout: 
[] Yes /  [x] No /  [] No Caregiver Present Anticipatory grief assessment:  
[x] Normal  / [] Maladaptive ESAS Anxiety: Anxiety: 0 
 
ESAS Depression: Depression: 0 REVIEW OF SYSTEMS:  
 
Positive and pertinent negative findings in ROS are noted above in HPI. The following systems were [x] reviewed / [] unable to be reviewed as noted in HPI Other findings are noted below. Systems: constitutional, ears/nose/mouth/throat, respiratory, gastrointestinal, genitourinary, musculoskeletal, integumentary, neurologic, psychiatric, endocrine. Positive findings noted below. Modified ESAS Completed by: provider Fatigue: 4 Drowsiness: 0 Depression: 0 Pain: 2 Anxiety: 0 Nausea: 0 Anorexia: 3 Dyspnea: 0 PHYSICAL EXAM:  
 
From RN flowsheet: 
Wt Readings from Last 3 Encounters:  
04/23/19 170 lb (77.1 kg) 04/22/19 170 lb (77.1 kg)  
11/21/18 164 lb (74.4 kg) Blood pressure 167/74, pulse (!) 58, temperature 97.2 °F (36.2 °C), resp. rate 17, height 5' 10\" (1.778 m), weight 170 lb (77.1 kg), SpO2 96 %. Pain Scale 1: Numeric (0 - 10) Pain Intensity 1: 3 Pain Location 1: Generalized Pain Intervention(s) 1: Declines, Repositioned Last bowel movement, if known:  
 
Constitutional: well nourished, awake, pleasant, NAD Eyes: pupils equal, anicteric ENMT: no nasal discharge, moist mucous membranes Cardiovascular: regular rhythm, distal pulses intact Respiratory: breathing not labored, symmetric Gastrointestinal: soft non-tender, +colostomy Musculoskeletal: right wrist drop, no tenderness to palpation Skin: warm, dry Neurologic:weak, following commands, moving all extremities Psychiatric: flat or mask like affect (PD) no hallucinations Other: 
 
 
 HISTORY:  
 
Principal Problem: 
  Loss of consciousness ([address] (4/22/2019) Active Problems: 
  Parkinson disease ([address] (4/22/2019) HTN (hypertension) (4/22/2019) Abnormal urine sediment (4/22/2019) Debility (4/22/2019) Past Medical History:  
Diagnosis Date  'light-for-dates' infant with signs of fetal malnutrition  Colon cancer ([address]  Hypertension  Lung cancer ([address]  Parkinson disease ([address]  Prostate cancer ([facility] [address] Past Surgical History:  
Procedure Laterality Date  HX COLOSTOMY  HX PROSTATECTOMY Family History Family history unknown: Yes History reviewed, no pertinent family history. Social History Tobacco Use  Smoking status: Former Smoker Start date: 11/21/2010  Smokeless tobacco: Never Used Substance Use Topics  Alcohol use: Yes Alcohol/week: 0.6 oz Types: 1 Cans of beer per week Frequency: Never Comment: occ No Known Allergies Current Facility-Administered Medications Medication Dose Route Frequency  aspirin chewable tablet 81 mg  81 mg Oral DAILY  0.9% sodium chloride infusion  75 mL/hr IntraVENous CONTINUOUS  
 sodium chloride (NS) flush 5-40 mL  5-40 mL IntraVENous Q8H  
 sodium chloride (NS) flush 5-40 mL  5-40 mL IntraVENous PRN  
 acetaminophen (TYLENOL) tablet 650 mg  650 mg Oral Q4H PRN  prochlorperazine (COMPAZINE) injection 10 mg  10 mg IntraVENous Q6H PRN  
 enoxaparin (LOVENOX) injection 40 mg  40 mg SubCUTAneous Q24H  carbidopa-levodopa (SINEMET)  mg per tablet 1.5 Tab  1.5 Tab Oral QID  carbidopa-levodopa ER (SINEMET CR)  mg per tablet 1 Tab  1 Tab Oral QHS  fludrocortisone (FLORINEF) tablet 100 mcg  0.1 mg Oral DAILY  HYDROcodone-acetaminophen (NORCO)  mg tablet 1 Tab  1 Tab Oral Q6H PRN  
 
 
 
 LAB AND IMAGING FINDINGS:  
 
Lab Results Component Value Date/Time WBC 5.2 04/23/2019 02:52 AM  
 HGB 7.6 (L) 04/23/2019 02:52 AM  
 PLATELET 613 47/43/0159 02:52 AM  
 
Lab Results Component Value Date/Time Sodium 138 04/23/2019 02:52 AM  
 Potassium 3.2 (L) 04/23/2019 02:52 AM  
 Chloride 105 04/23/2019 02:52 AM  
 CO2 27 04/23/2019 02:52 AM  
 BUN 19 04/23/2019 02:52 AM  
 Creatinine 0.48 (L) 04/23/2019 02:52 AM  
 Calcium 7.6 (L) 04/23/2019 02:52 AM  
 Magnesium 1.6 04/23/2019 02:52 AM  
 Phosphorus 2.7 04/23/2019 02:52 AM  
  
Lab Results Component Value Date/Time AST (SGOT) 6 (L) 04/22/2019 11:31 AM  
 Alk. phosphatase 78 04/22/2019 11:31 AM  
 Protein, total 6.5 04/22/2019 11:31 AM  
 Albumin 3.1 (L) 04/22/2019 11:31 AM  
 Globulin 3.4 04/22/2019 11:31 AM  
 
No results found for: INR, PTMR, PTP, PT1, PT2, APTT No results found for: IRON, FE, TIBC, IBCT, PSAT, FERR No results found for: PH, PCO2, PO2 No components found for: [place] No results found for: CPK, CKMB Total time: 100 min Counseling / coordination time, spent as noted above: 80 min > 50% counseling / coordination?: yes Prolonged service was provided for  [x]30 min   []75 min in face to face time in the presence of the patient, spent as noted above. Time Start: 1045  Time End: 0 Time Start; 51 PL Note: this can only be billed with 61267 (initial) or 56987 (follow up). If multiple start / stop times, list each separately.

## 2019-04-23 NOTE — PROGRESS NOTES
Occupational therapy note: 
Orders received, chart reviewed. Attempted to see patient for OT evaluation. Patient currently off the floor for testing. Will follow up with patient at later time. Laly Simmons MS OTR/L

## 2019-04-23 NOTE — PROGRESS NOTES
[facility]: MED RECONCILIATION Comments/Recommendations:  
? Reviewed medications with patient and wife. His wife appears to be more knowledgeable about patient's current medications. Please see below regarding changes to medications according to office visit from 4/10/19 with Dr. Anju Haynes at [facility]. 
? In addition to the other changes noted below, midodrine 10 mg PO BID appears to have been dc'd per office visit notes. Dr. July Patino notes also state \"if lightheaded and woozy sitting up, can restart 1/2 tablet twice daily\". Medications added: · Fludrocortisone 0.1 mg PO daily - confirmed dosage and sig with [address] · Sinemet 50/200 mg CR PO QHS Medications removed: 
 
· Entacapone - Per office visit on 4/10/19, patient is to stop taking this. Medications adjusted: · Sinemet 25/100 mg tablets - Currently taking 1.5 mg PO QID at 0600/1000/1400/1800 Per instructions from Dr. Anju Haynes on 4/10/19: 
Week 1 (4/11-4/17): Take 1 tablet at 0600/1000/1400/1800 Week 2 (4/18-4/24): Take 1.5 tablets at 0600/1000/1400/1800 Week 3 (4/26-5/2): Take 2 tablets at 0600/1000 & 1.5 tablets at 1400/1800 Week 4 (5/3-5/9): Take 2 tablets at 0600/1000/1400/1800 May stop increasing if good motor symptom control or any issues ? Norco 10/325 mg PO BID PRN - confirmed dosage and sig with [address] Information obtained from: patient, patient's wife, MD visit notes from 4/10/19 (Dr. Anju Haynes at [facility]), pharmacist at [address] Significant PMH/Disease States:  
Past Medical History:  
Diagnosis Date  Colon cancer ([facility] [address]  Hypertension  Lung cancer ([facility] [address]  Parkinson disease ([facility] [address]  Prostate cancer ([facility] [address] Chief Complaint for this Admission: Chief Complaint Patient presents with  Altered mental status  Hypotension Allergies: Patient has no known allergies. Prior to Admission Medications:  
 
Medication Documentation Review Audit Reviewed by Bre Plummer, PHARMD (Pharmacist) on 04/22/19 at 2031 Medication Sig Documenting Provider Last Dose Status Taking?  
carbidopa-levodopa (SINEMET)  mg per tablet Take 1.5 Tabs by mouth four (4) times daily. Per instructions from Dr. Dayton Rebolledo on 4/10/19 Week 1 (4/11-4/17): Take 1 tablet at 0600/1000/1400/1800 Week 2 (4/18-4/24): Take 1.5 tablets at 0600/1000/1400/1800 Week 3 (4/26-5/2): Take 2 tablets at 0600/1000 & 1.5 tablets at 1400/1800 Week 4 (5/3-5/9): Take 2 tablets at 0600/1000/1400/1800 May stop increasing if good motor symptom control or any issues Provider, Historical 4/22/2019 1400 Active Yes Med Note ([facility]   Mon Apr 22, 2019  8:11 PM) Currently on Week 2 (see instructions for dosing regimen)  
carbidopa-levodopa ER (SINEMET CR)  mg per tablet Take 1 Tab by mouth nightly. Provider, Historical 4/21/2019 pm Active Yes  
fludrocortisone (FLORINEF) 0.1 mg tablet Take 0.1 mg by mouth daily. Provider, Historical 4/22/2019 am Active Yes HYDROcodone-acetaminophen (NORCO)  mg tablet Take 1 Tab by mouth two (2) times daily as needed. Provider, Historical 4/22/2019 1700 Active Yes Thank you for the consult, 
Kadeem Jacobs D, [address]

## 2019-04-23 NOTE — PROGRESS NOTES
Bedside and Verbal shift change report given to Maria Guadalupe Lloyd RN (oncoming nurse) by Derek Cade RN (offgoing nurse). Report included the following information SBAR, Kardex, MAR, Accordion and Recent Results.

## 2019-04-23 NOTE — ROUTINE PROCESS
TRANSFER - OUT REPORT: 
 
Verbal report given to Julissa Weber RN(name) on Reza Casanova  being transferred to tele(unit) for routine progression of care Report consisted of patients Situation, Background, Assessment and  
Recommendations(SBAR). Information from the following report(s) ED Summary and MAR was reviewed with the receiving nurse. Lines:    
 
Opportunity for questions and clarification was provided. Patient transported with: 
 Registered Nurse

## 2019-04-23 NOTE — CONSULTS
[facility] [facility]: [address] [facility] [address] [facility] [phone] Name:   Tonie Anne Medical record #: [id number] Admission Date: 4/22/2019 Who Consulted: Dr. Glenna Hrenandez Reason for Consult:  AMS HISTORY OF PRESENT ILLNESS:  
This is a 80 y.o. male with  has a past medical history of Colon cancer ([facility] [address] [facility] Hypertension, Lung cancer ([facility] [address] [facility] Parkinson disease ([facility] [address] [facility] and Prostate cancer ([facility] [address] who is admitted for AMS. The Neurology Service is asked to evaluate for AMS. Mr. Lluvia Carroll presented to the ED with chief complaint of altered mental status and hypotension. His wife reports that she found him drooling, with his head forward, eyes closed\" and \"not responsive\" to her shaking him or trying to open his eyes. Spouse reports that she called EMS. EMS personnel report that the spouse told them the patient \"isn't right\". They state that patient became hypotensive en route. He was seen in the ED at [facility] for worsening Parkinson's symptoms in Feb of 2019 including weakness and difficulty swallowing at night, his first appointment with [facility] Neurology is not until June of 2019. According to their records he is on Carbidopa-Levodopa (2.5 tablets 4 times daily), and Entacapone. Prior to that he was followed by Dr. Loraine Mccrary and last saw him on 1/14/2019. He was also seen by Dr. Eve Sawyer at [facility] on 4/10/2019. Per Dr. Arnol Kenny note: 
 
Was in inpatient rehab at [facility] in March 2019, seen by neurology consult service re: 
sinemet dosing which was adjusted to 3 tabs 25/100 TID (BP drops with 25/250 tabs). States noone ever clearly told him he had PD so dx is unclear to him. Initial symptom was LH \"wasn't listening\" to him per patient, but spouse states that is only recent. Has been seeing Dr Albaro Colin for 2-3 years for his symptoms.  
 
Voice soft and slurred, and per patient more stuttering than in past. 
Swallow harder than in the past, has trouble drinking out of bottles although glass OK. Occasional coughing with both eating and drinking. Chokes sometimes w/ saliva, and rarely w/ eating or drinking. Tremor started in 2017. Tremor started in L side, now more in L than R. Handwriting very small now, a change for him. Fine motor difficult. Walks with a walker, now working with PT at home. No falls while using walker. Started using walker roughly 1.5 years ago. No dyskinesias. Wearing off: can't tell because can't tell what meds are helping with per patient. Spouse states meds help with the tremor. Last saw Dr Rex Choi about 4-5 months ago, this is when entacapone was added to try to help w/ shuffling gait. We spent 75min total visit time with over 50% in counseling regarding concept of clinical diagnoses, why PD best diagnosis fit at this point, going 
over reasoning behind plan below including pros and cons of medication changes. Spouse had repeated questions about why patient was on the 
antibiotic, although she was able to tell me that it was probably for a bladder infection - was confused as she thought reason for hospital 
admission was pneumonia. She also requested review of reason patient was taking all his various medications so she could write that on his 
med sheet and better understand them, done to best of my ability. Discontinue entacapone for now. Sinemet (carbidopa/levodopa) CR 50/200 one tab roughly bedtime Sinemet (carbidopa/levodopa) 25/100: 
6am 10am 2pm 6pm 
week 1 1 1 1 1 
week 2 1.5 1.5 1.5 1.5 
week 3 2 2 1.5 1.5 
week 4 2 2 2 2 Stop increasing if good motor symptom control OR any issues 
midodrine 10mg: discontinue. If lightheaded and woozy sitting up can restart half tab twice a day (morning, midday). Continue fludrocortisone for now although we may discontinue this as well. Clinical Data: 
Imaging review: CT head:  None available, chart review does not review any recent imaging Current rhythm: 
 
Assessment/Plan: 1.  Altered Mental Status: · Neurochecks:  Every 4 hours · Significant drop in hgb yesterday (Dilutional?), Hypokalemia,  
· Ua, TSH,  
· Check  Folate, B12, Ammonia · EEG to rule out subclinical seizures 2. Rule out stroke · New left sided facial droop and wrist drop. · ASA 81 mg · Will need ASA at discharge · Neurochecks:  Every 4 hours · Blood Sugar Goal:  140-180 · BP Goal: Less than 160 for 24 hours · Telemetry for at least 24 hours Stroke work up · A1C: 
· LDL:   
· TTE:   
· Follow up MRI: 
· Carotid vascular imaging: 
 
Risk factors for stroke include:  DM, HTN, CAD, HLD, physical inactivity,  
· Discussed with patient · Discussed signs/symptoms of stroke and when to call 911 2. Mobility:  
· Has been OOB. · PT/OT to eval for rehab 4. Diet:   
· Does not need SLP, passed STAND 5. VTE Prophylaxes: · Per primary team  
 
Thank you for allowing the Neurology Service the pleasure of participating in the care of your patient. This patient will be discussed with my collaborating care team physician Dr. Lieutenant Joyner and he may have further recommendations regarding this patient's care. Attending Attestation:  
 
NEUROLOGY NOTE ADDENDUM: 
 
4/23/2019 I have reviewed the documentation provided by the nurse practitioner, discussed her findings, clinical impression, and the proposed management plans with regards to this patient's encounter. I have personally performed a face to face diagnostic evaluation on this patient. My findings are as follows: 
 
 
Noam Ball is a 80 y.o. male who  has a past medical history of Colon cancer ([facility] [address] Hypertension, Lung cancer ([facility] [address] Parkinson disease ([place] [address] and Prostate cancer ([facility] [address] who presents for evaluation of AMS Mr. Rubén Olsen presented to the ED with chief complaint of altered mental status and hypotension.  His wife reports that she found him drooling, with his head forward, eyes closed\" and \"not responsive\" to her shaking him or trying to open his eyes. Spouse reports that she called EMS. EMS personnel report that the spouse told them the patient \"isn't right\". They state that patient became hypotensive en route. Patient follows up at [facility] for parkinson's disease on Sinemet. 
 
(+) 1-2 weeks new onset L wrist drop Exam: 
Visit Vitals /74 (BP 1 Location: Left arm, BP Patient Position: At rest) Pulse 62 Temp 97.7 °F (36.5 °C) Resp 16 Ht 5' 10\" (1.778 m) Wt 77.1 kg (170 lb) SpO2 98% BMI 24.39 kg/m² Gen: Awake, alert, follows commands Appropriate appearance, normal speech. No visual field defect on confrontation exam. 
Full eyes movement, with no nystagmus, no diplopia, no ptosis. Normal gag and swallow. All remaining cranial nerves were normal 
Motor function: (+) L wrist drop Rest is 5/5 
(+) masked facies (+) rigidity 
(+) bradykinesia Sensory: intact to LT 
DTRs + in all extremities, (-) Babinski Good FTN and HTS Gait: Deferred Assessment New onset speech disturbance and L wrist drop; evaluate for stroke Advanced Parksinon's disease Plan 1) WIll do MRI brain and stroke work up 2) If MRI brain is negative for stroke, consider EMG/NCS of the L UE looking for radial nerve palsy at the spiral groove segment 3) Will put on ASA 81 
4) Physical therapy/ Occupational therapy Thank you for the consultation. Karin Mariee MD 
Diplomate, American Board of Psychiatry and Neurology Diplomate, Neuromuscular Medicine Diplomate, [address] [place] [address] Board of Electrodiagnostic Medicine Review of Systems: 10 point ROS was performed. Pertinent positives listed in HPI. Negative ROS is as follows.   Pt denies: angina, palpitations, paresthesias, weakness, vision loss,, aphasia, confusion, fever, chills, falls, headache, diplopia, back pain, neck pain, prior episodes of vertigo, hallucinations, new medications or dosage changes. PHYSICAL EXAM:  
 
Visit Vitals /76 (BP 1 Location: Right arm, BP Patient Position: Sitting) Pulse 76 Temp 97.3 °F (36.3 °C) Resp 15 Ht 5' 10\" (1.778 m) Wt 77.1 kg (170 lb) SpO2 91% BMI 24.39 kg/m² O2 Device: Room air Temp (24hrs), Av.3 °F (36.3 °C), Min:97.3 °F (36.3 °C), Max:97.4 °F (36.3 °C) 
  701 - 1900 In: -  
Out: 150 [Urine:150]   1901 -  07 In: -  
Out: 200 [Urine:200] General:  Alert, cooperative, no acute distress. Lungs:   Clear to auscultation bilaterally. No crackles/wheezes. Heart: 
Abdominal:  Regular rate and rhythm, No murmur, click, rub or gallop. Soft and nondistended Skin: Skin color, texture, turgor normal.   
NEUROLOGICAL EXAM: 
 
Appearance:  Well developed, well nourished,  and is in no acute distress. Mental Status: Oriented to time, place and person. Fully attentive. No aphasia. Full fund of knowledge. Normal recent and remote memory. Cranial Nerves:   Intact visual fields. PERRL, EOM's full, no nystagmus, no ptosis. Facial sensation is normal. Facial movement is symmetric. Palate is midline. Normal sternocleidomastoid strength. Tongue is midline. Reflexes:   Deep tendon reflexes +1 on left and normal on the right Sensory:   Normal to temperature and vibration. Gait:  Not tested. Tremor:   Left sided tremor, bradykinetic, Cerebellar:  No cerebellar signs present. Motor: No pronator drift of either outstretched arm. Left sided wrist droop Deltoid Biceps Triceps Wrist Extension Finger Abduction L 5 5 5 5 5  
R 5 5 5 5 5 Hip Flexion Hip Extension Knee Flexion Knee Extension Ankle Dorsiflexion Ankle Plantarflexion L 5 5 5 5 5 5  
R 5 5 5 5 5 5 Reflexes:   
 Biceps Triceps Plantar Patellar Achilles L 2 2 2 2 2 R 2 2 2 2 2 Past Medical History:  
Diagnosis Date  [facility]  Hypertension  Lung cancer ([facility]  Parkinson disease ([facility]  Prostate cancer ([facility] Past Surgical History:  
Procedure Laterality Date  HX COLOSTOMY  HX PROSTATECTOMY Family History Family history unknown: Yes  
 
Social History Socioeconomic History  Marital status:  Spouse name: Not on file  Number of children: Not on file  Years of education: Not on file  Highest education level: Not on file Occupational History  Not on file Social Needs  Financial resource strain: Not on file  Food insecurity:  
  Worry: Not on file Inability: Not on file  Transportation needs:  
  Medical: Not on file Non-medical: Not on file Tobacco Use  Smoking status: Former Smoker Start date: 11/21/2010  Smokeless tobacco: Never Used Substance and Sexual Activity  Alcohol use: Yes Alcohol/week: 0.6 oz Types: 1 Cans of beer per week Frequency: Never Comment: occ  Drug use: No  
 Sexual activity: Never Lifestyle  Physical activity:  
  Days per week: Not on file Minutes per session: Not on file  Stress: Not on file Relationships  Social connections:  
  Talks on phone: Not on file Gets together: Not on file Attends [employer] witness service: Not on file Active member of club or organization: Not on file Attends meetings of clubs or organizations: Not on file Relationship status: Not on file  Intimate partner violence:  
  Fear of current or ex partner: Not on file Emotionally abused: Not on file Physically abused: Not on file Forced sexual activity: Not on file Other Topics Concern  Not on file Social History Narrative ** Merged History Encounter ** Allergies:  
No Known Allergies Outpatient Meds Current Facility-Administered Medications on File Prior to Encounter Medication Dose Route Frequency Provider Last Rate Last Dose  [COMPLETED] sodium chloride 0.9 % bolus infusion 500 mL  500 mL IntraVENous NOW Luis Levy MD   Stopped at 04/22/19 1335  
 [COMPLETED] acetaminophen (TYLENOL) tablet 1,000 mg  1,000 mg Oral NOW Luis Levy MD   1,000 mg at 04/22/19 1459 Current Outpatient Medications on File Prior to Encounter Medication Sig Dispense Refill  carbidopa-levodopa ER (SINEMET CR)  mg per tablet Take 1 Tab by mouth nightly.  carbidopa-levodopa (SINEMET)  mg per tablet Take 1.5 Tabs by mouth four (4) times daily. Per instructions from Dr. Mariam Tee on 4/10/19 Week 1 (4/11-4/17): Take 1 tablet at 0600/1000/1400/1800 Week 2 (4/18-4/24): Take 1.5 tablets at 0600/1000/1400/1800 Week 3 (4/26-5/2): Take 2 tablets at 0600/1000 & 1.5 tablets at 1400/1800 Week 4 (5/3-5/9): Take 2 tablets at 0600/1000/1400/1800 May stop increasing if good motor symptom control or any issues  fludrocortisone (FLORINEF) 0.1 mg tablet Take 0.1 mg by mouth daily.  HYDROcodone-acetaminophen (NORCO)  mg tablet Take 1 Tab by mouth two (2) times daily as needed. Inpatient Meds Current Facility-Administered Medications:  
  0.9% sodium chloride infusion, 75 mL/hr, IntraVENous, CONTINUOUS, Gillian Juarez MD, Last Rate: 75 mL/hr at 04/22/19 2356, 75 mL/hr at 04/22/19 2356   sodium chloride (NS) flush 5-40 mL, 5-40 mL, IntraVENous, Q8H, Gillian Juarez MD, 10 mL at 04/23/19 4738 
  sodium chloride (NS) flush 5-40 mL, 5-40 mL, IntraVENous, PRN, Gillian Juarez MD 
  acetaminophen (TYLENOL) tablet 650 mg, 650 mg, Oral, Q4H PRN, Gillian Juarez MD 
  prochlorperazine (COMPAZINE) injection 10 mg, 10 mg, IntraVENous, Q6H PRN, Gillian Juarez MD 
  enoxaparin (LOVENOX) injection 40 mg, 40 mg, SubCUTAneous, Q24H, Gillian Juarez MD, 40 mg at 04/22/19 8822   carbidopa-levodopa (SINEMET)  mg per tablet 1.5 Tab, 1.5 Tab, Oral, QID, Gillian Juarez MD, 1.5 Tab at 04/23/19 5013   carbidopa-levodopa ER (SINEMET CR)  mg per tablet 1 Tab, 1 Tab, Oral, QHS, Alexandra Moore MD, 1 Tab at 04/22/19 2315   fludrocortisone (FLORINEF) tablet 100 mcg, 0.1 mg, Oral, DAILY, Alexandra Moore MD 
  HYDROcodone-acetaminophen [facility])  mg tablet 1 Tab, 1 Tab, Oral, Q6H PRN, Alexandra Moore MD, 1 Tab at 04/22/19 2314 Recent Results (from the past 24 hour(s)) SAMPLES BEING HELD Collection Time: 04/22/19 11:31 AM  
Result Value Ref Range SAMPLES BEING HELD SST,NICOLÁS,1BLDCS   
 COMMENT Add-on orders for these samples will be processed based on acceptable specimen integrity and analyte stability, which may vary by analyte. URINALYSIS W/MICROSCOPIC Collection Time: 04/22/19 11:31 AM  
Result Value Ref Range Color DARK YELLOW Appearance CLOUDY (A) CLEAR Specific gravity 1.028 1.003 - 1.030    
 pH (UA) 6.0 5.0 - 8.0 Protein 300 (A) NEG mg/dL Glucose NEGATIVE  NEG mg/dL Ketone 15 (A) NEG mg/dL Blood MODERATE (A) NEG Urobilinogen 1.0 0.2 - 1.0 EU/dL Nitrites NEGATIVE  NEG Leukocyte Esterase SMALL (A) NEG    
 WBC 5-10 0 - 4 /hpf  
 RBC 20-50 0 - 5 /hpf Epithelial cells FEW FEW /lpf Bacteria NEGATIVE  NEG /hpf Mucus 2+ (A) NEG /lpf  
 CA Oxalate crystals FEW (A) NEG Hyaline cast 0-2 0 - 5 /lpf URINE CULTURE HOLD SAMPLE Collection Time: 04/22/19 11:31 AM  
Result Value Ref Range Urine culture hold URINE ON HOLD IN MICROBIOLOGY DEPT FOR 3 DAYS. IF UNPRESERVED URINE IS SUBMITTED, IT CANNOT BE USED FOR ADDITIONAL TESTING AFTER 24 HRS, RECOLLECTION WILL BE REQUIRED. CBC WITH AUTOMATED DIFF Collection Time: 04/22/19 11:31 AM  
Result Value Ref Range WBC 9.4 4.1 - 11.1 K/uL  
 RBC 4.09 (L) 4.10 - 5.70 M/uL  
 HGB 10.0 (L) 12.1 - 17.0 g/dL HCT 33.1 (L) 36.6 - 50.3 % MCV 80.9 80.0 - 99.0 FL  
 MCH 24.4 (L) 26.0 - 34.0 PG  
 MCHC 30.2 30.0 - 36.5 g/dL  
 RDW 18.2 (H) 11.5 - 14.5 % PLATELET 685 812 - 439 K/uL MPV 10.4 8.9 - 12.9 FL  
 NRBC 0.0 0  WBC ABSOLUTE NRBC 0.00 0.00 - 0.01 K/uL NEUTROPHILS 86 (H) 32 - 75 % LYMPHOCYTES 6 (L) 12 - 49 % MONOCYTES 6 5 - 13 % EOSINOPHILS 1 0 - 7 % BASOPHILS 0 0 - 1 % IMMATURE GRANULOCYTES 1 (H) 0.0 - 0.5 % ABS. NEUTROPHILS 8.0 1.8 - 8.0 K/UL  
 ABS. LYMPHOCYTES 0.6 (L) 0.8 - 3.5 K/UL  
 ABS. MONOCYTES 0.6 0.0 - 1.0 K/UL  
 ABS. EOSINOPHILS 0.1 0.0 - 0.4 K/UL  
 ABS. BASOPHILS 0.0 0.0 - 0.1 K/UL  
 ABS. IMM. GRANS. 0.1 (H) 0.00 - 0.04 K/UL  
 DF SMEAR SCANNED    
 RBC COMMENTS NORMOCYTIC, NORMOCHROMIC METABOLIC PANEL, COMPREHENSIVE Collection Time: 04/22/19 11:31 AM  
Result Value Ref Range Sodium 135 (L) 136 - 145 mmol/L Potassium 3.5 3.5 - 5.1 mmol/L Chloride 100 97 - 108 mmol/L  
 CO2 30 21 - 32 mmol/L Anion gap 5 5 - 15 mmol/L Glucose 94 65 - 100 mg/dL BUN 18 6 - 20 MG/DL Creatinine 0.60 (L) 0.70 - 1.30 MG/DL  
 BUN/Creatinine ratio 30 (H) 12 - 20 GFR est AA >60 >60 ml/min/1.73m2 GFR est non-AA >60 >60 ml/min/1.73m2 Calcium 8.6 8.5 - 10.1 MG/DL Bilirubin, total 0.7 0.2 - 1.0 MG/DL  
 ALT (SGPT) <6 (L) 12 - 78 U/L  
 AST (SGOT) 6 (L) 15 - 37 U/L Alk. phosphatase 78 45 - 117 U/L Protein, total 6.5 6.4 - 8.2 g/dL Albumin 3.1 (L) 3.5 - 5.0 g/dL Globulin 3.4 2.0 - 4.0 g/dL A-G Ratio 0.9 (L) 1.1 - 2.2 BILIRUBIN, CONFIRM Collection Time: 04/22/19 11:31 AM  
Result Value Ref Range Bilirubin UA, confirm NEGATIVE  NEG    
CBC W/O DIFF Collection Time: 04/23/19  2:52 AM  
Result Value Ref Range WBC 5.2 4.1 - 11.1 K/uL  
 RBC 3.09 (L) 4.10 - 5.70 M/uL HGB 7.6 (L) 12.1 - 17.0 g/dL HCT 24.9 (L) 36.6 - 50.3 % MCV 80.6 80.0 - 99.0 FL  
 MCH 24.6 (L) 26.0 - 34.0 PG  
 MCHC 30.5 30.0 - 36.5 g/dL  
 RDW 18.2 (H) 11.5 - 14.5 % PLATELET 470 600 - 623 K/uL MPV 10.2 8.9 - 12.9 FL  
 NRBC 0.0 0  WBC ABSOLUTE NRBC 0.00 0.00 - 0.01 K/uL MAGNESIUM Collection Time: 04/23/19  2:52 AM  
Result Value Ref Range Magnesium 1.6 1.6 - 2.4 mg/dL METABOLIC PANEL, BASIC Collection Time: 04/23/19  2:52 AM  
Result Value Ref Range Sodium 138 136 - 145 mmol/L Potassium 3.2 (L) 3.5 - 5.1 mmol/L Chloride 105 97 - 108 mmol/L  
 CO2 27 21 - 32 mmol/L Anion gap 6 5 - 15 mmol/L Glucose 81 65 - 100 mg/dL BUN 19 6 - 20 MG/DL Creatinine 0.48 (L) 0.70 - 1.30 MG/DL  
 BUN/Creatinine ratio 40 (H) 12 - 20 GFR est AA >60 >60 ml/min/1.73m2 GFR est non-AA >60 >60 ml/min/1.73m2 Calcium 7.6 (L) 8.5 - 10.1 MG/DL  
PHOSPHORUS Collection Time: 04/23/19  2:52 AM  
Result Value Ref Range Phosphorus 2.7 2.6 - 4.7 MG/DL  
TSH 3RD GENERATION Collection Time: 04/23/19  2:52 AM  
Result Value Ref Range TSH 1.23 0.36 - 3.74 uIU/mL Care Plan discussed with: 
Patient x Family RN Care Manager Consultant/Specialist:    
 
 
Katy Ribeiro, DEVANP-BC

## 2019-04-23 NOTE — H&P
[address] 
[phone] Admission History and Physical 
 
 
NAME:  Reza Casanova :   1935 MRN:  [id number] PCP:  Lanier Najjar, MD  
 
Date/Time:  2019 Subjective: CHIEF COMPLAINT: loss of consciousness HISTORY OF PRESENT ILLNESS:    
Mr. Sanna Falcon is a 80 y.o.  male who is admitted with loss of consciousness. Mr. Sanna Falcon presented to the Emergency Department this PM and earlier this MA with his wife reporting LOC: 2 episodes today, sudden onset, slumped forward this evening, after dinner, drooling some, no convulsive activity or BBI noted; earlier this AM had episode of staring and no verbal response but was reportedly following commands History obtained from spouse, chart review and unobtainable from patient due to mental status. Previous records reviewed, ED visit to [facility] in February with progressive decline, followed up with Neurology at [facility] but didn't like the neurologist and is now being seen at [facility] Past Medical History:  
Diagnosis Date  Colon cancer ([facility]  Hypertension  Lung cancer ([facility]  Parkinson disease ([facility]  Prostate cancer ([facility] Past Surgical History:  
Procedure Laterality Date  HX COLOSTOMY  HX PROSTATECTOMY Social History Tobacco Use  Smoking status: Former Smoker Start date: 2010  Smokeless tobacco: Never Used Substance Use Topics  Alcohol use: Yes Alcohol/week: 0.6 oz Types: 1 Cans of beer per week Frequency: Never Comment: occ Family History Family history unknown: Yes Patient and wife do not remember No Known Allergies Prior to Admission medications Medication Sig Start Date End Date Taking? Authorizing Provider  
carbidopa-levodopa ER (SINEMET CR)  mg per tablet Take 1 Tab by mouth nightly.    Yes Provider, Historical  
 carbidopa-levodopa (SINEMET)  mg per tablet Take 1.5 Tabs by mouth four (4) times daily. Per instructions from Dr. Ronald [place] on 4/10/19 Week 1 (4/11-4/17): Take 1 tablet at 0600/1000/1400/1800 Week 2 (4/18-4/24): Take 1.5 tablets at 0600/1000/1400/1800 Week 3 (4/26-5/2): Take 2 tablets at 0600/1000 & 1.5 tablets at 1400/1800 Week 4 (5/3-5/9): Take 2 tablets at 0600/1000/1400/1800 May stop increasing if good motor symptom control or any issues 4/18/19 4/24/19 Yes Provider, Historical  
fludrocortisone (FLORINEF) 0.1 mg tablet Take 0.1 mg by mouth daily. Yes Provider, Historical  
HYDROcodone-acetaminophen (NORCO)  mg tablet Take 1 Tab by mouth two (2) times daily as needed. Yes Provider, Historical  
 
 
 
Review of Systems: 
(bold if positive, if negative) Gen:  fatigueEyes:  ENT:  CVS:  Pulm:  GI:   
:   
MS:  PainarthritisSkin:  Psych:  Endo:   
Hem:  Renal:   
Neuro:    
 
 
  
Objective: VITALS:   
Vital signs reviewed; most recent are: 
 
Visit Vitals /47 Pulse 72 Temp 97.4 °F (36.3 °C) Resp 13 Ht 5' 10\" (1.778 m) Wt 77.1 kg (170 lb) SpO2 95% BMI 24.39 kg/m² SpO2 Readings from Last 6 Encounters:  
04/22/19 95% 04/22/19 99% 01/14/19 97% 12/13/18 95% 11/21/18 98% 11/20/18 95% No intake or output data in the 24 hours ending 04/22/19 2053 Exam:  
 
Physical Exam: 
 
Gen: Well-developed, well-nourished, frail, elderly, chronically ill-appearing, in no acute distress HEENT:  Pink conjunctivae, PERRL, hearing intact to voice, moist mucous membranes Neck: Supple, without masses, thyroid non-tender Resp: No accessory muscle use, clear breath sounds without wheezes rales or rhonchi 
Card: No murmurs, normal S1, S2 without thrills, bruits or peripheral edema Abd:  Soft, non-tender, non-distended, normoactive bowel sounds are present, no palpable organomegaly and no detectable hernias Lymph:  No cervical or inguinal adenopathy Musc: No cyanosis or clubbing Skin: No rashes or ulcers, skin turgor is good Neuro:  Cranial nerves are grossly intact, no focal motor weakness, follows commands appropriately Psych:  Poor insight, oriented to person, place but not time, alert Labs: 
 
Recent Labs  
  04/22/19 
1131 WBC 9.4 HGB 10.0* HCT 33.1*  
 Recent Labs  
  04/22/19 
1131 * K 3.5  CO2 30  
GLU 94 BUN 18 CREA 0.60* CA 8.6 ALB 3.1* TBILI 0.7 SGOT 6* ALT <6* No results found for: Ariadne Aguirre No results for input(s): PH, PCO2, PO2, HCO3, FIO2 in the last 72 hours. No results for input(s): INR in the last 72 hours. No lab exists for component: INREXT, INREXT 
 
 
EKG - sinus with PVCs, resting tremor affects baseline, visualized by me. Assessment/Plan:   
  
Principal Problem: 
  Loss of consciousness ([facility] (4/22/2019) 
 - unclear exactly what these two episodes represent or if they are even related - patient and wife are both poor historians 
 - wife seems overwhelmed with his care and I question whether she is capable of taking care of him at this point 
 - will have Neurology evaluate in the AM, check TSH and check orthostatics 
 - PT/OT consults Active Problems: 
  Parkinson disease ([address] (4/22/2019) - continue Parkinsons meds, Neurology may need to adjust 
 
  HTN (hypertension) (4/22/2019) - BP low in the ED, suspect he is somewhat volume depleted - IV fluids overnight 
 - not on BP medications but is on Florinef which leads me to believe he has chronic orthostasis related to his Parkinsons Abnormal urine sediment (4/22/2019) 
 - does not appear infected but will send for culture 
 - may just be active sediment from dehydration Debility (4/22/2019) 
 - PT/OT to assess, wife may not be able to safely care for him 
 - Case Management consult Code status: 
 - patient is FULL CODE, no AMD on file, wife Danielle is surrogate - Palliative Care consulte Total time spent on patient care: 70 Minutes Care Plan discussed with: Patient, Family, Nursing Staff and Dr. Rea Rader Discussed:  Code Status, Care Plan and D/C Planning Prophylaxis:  Lovenox and SCD's 
 
Probable Disposition:  TBD 
        
___________________________________________________ Attending Physician: Manasa Neri MD

## 2019-04-23 NOTE — PROGRESS NOTES
Bedside and Verbal shift change report given to Steph Jc RN (oncoming nurse) by Asad Girard RN (offgoing nurse). Report included the following information SBAR, Kardex, MAR, Accordion and Med Rec Status.
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
## 2019-04-23 NOTE — PROGRESS NOTES
Spiritual Care Assessment/Progress Note [address] 
 
 
NAME: Rik Shine      MRN: [id number] AGE: 80 y.o. SEX: male Mandaeism Affiliation: No preference Language: [place] 4/23/2019     Total Time (in minutes): 62 Spiritual Assessment begun in [facility]  MED SURG 2 through conversation with: 
  
    []Patient        [x] Family    [] Friend(s) Reason for Consult: Palliative Care, Initial/Spiritual Assessment Spiritual beliefs Per wife: (Please include comment if needed) [x] Identifies with a timothy tradition:    Mariella  
   [] Supported by a timothy community:        
   [] Claims no spiritual orientation:       
   [] Seeking spiritual identity:            
   [] Adheres to an individual form of spirituality:       
   [] Not able to assess:                   
 
    
Identified resources for coping:  
   [] Prayer                           
   [] Music                  [] Guided Imagery 
   [] Family/friends                 [] Pet visits [] Devotional reading                         [] Unknown 
   [] Other:                                         
 
 
Interventions offered during this visit: (See comments for more details) Family/Friend(s): Affirmation of emotions/emotional suffering, Affirmation of timothy, Catharsis/review of pertinent events in supportive environment, Iconic (affirming the presence of God/Higher Power), Life review/legacy, Prayer (assurance of) Plan of Care: 
 
 [] Support spiritual and/or cultural needs  
 [] Support AMD and/or advance care planning process    
 [] Support grieving process 
 [] Coordinate Rites and/or Rituals  
 [] Coordination with community clergy [] No spiritual needs identified at this time 
 [] Detailed Plan of Care below (See Comments)  [] Make referral to Music Therapy 
[] Make referral to Pet Therapy    
[] Make referral to Addiction services 
[] Make referral to [facility] [] Make referral to Spiritual Care Partner 
[] No future visits requested       
[x] Follow up visits as needed Comments: [facility] Team NP Asya and Ashley Negron as they shared with Mr. Coreen Carvajal wife on 5 med surg. Provdied supportive spiritual presence as his wife shared about their journey in life and how things have changed. She shared they have three children, one has , one lives near [facility] and the other lives in [place]. She and her  have been together for 62 years. They have only lived here in [place] since November. Prior to that they have lived in any different states. She shared she has an [address] belief in God. She is also a determined woman to make sure things go well for her  and herself. Provided spiritual presence and assurance of prayer. Visited by: Matilde Chery., [place]., [address] ([id number])

## 2019-04-24 PROBLEM — D64.9 ANEMIA: Chronic | Status: ACTIVE | Noted: 2019-01-01

## 2019-04-24 NOTE — PROGRESS NOTES
[facility] [place] [facility] 
[facility], [address] [place] [address] 
[phone] Medical Progress Note NAME: Reza Casanova :  1935 MRM:  [id number] Date/Time: 2019  11:08 AM 
 
 
  
Subjective: Chief Complaint:  LOC: no further episodes ROS: 
(bold if positive, if negative) Tolerating PT  Tolerating Diet Objective:  
 
 
Vitals:  
 
 
  
Last 24hrs VS reviewed since prior progress note. Most recent are: 
 
Visit Vitals /84 (BP 1 Location: Right arm, BP Patient Position: At rest) Pulse 82 Temp 97.5 °F (36.4 °C) Resp 18 Ht 5' 10\" (1.778 m) Wt 77.1 kg (170 lb) SpO2 92% BMI 24.39 kg/m² SpO2 Readings from Last 6 Encounters:  
19 92% 19 99% 19 97% 18 95% 18 98% 18 95% Intake/Output Summary (Last 24 hours) at 2019 1108 Last data filed at 2019 5070 Gross per 24 hour Intake 1910 ml Output 1100 ml Net 810 ml Exam:  
 
Physical Exam: 
 
Gen:  Well-developed, well-nourished, frail, eldelry, chronically ill-appearing, in no acute distress HEENT:  Pink conjunctivae, PERRL, hearing intact to voice, moist mucous membranes Neck:  Supple, without masses, thyroid non-tender Resp:  No accessory muscle use, clear breath sounds without wheezes rales or rhonchi 
Card:  No murmurs, normal S1, S2 without thrills, bruits or peripheral edema Abd:  Soft, non-tender, non-distended, normoactive bowel sounds are present, no palpable organomegaly and no detectable hernias Lymph:  No cervical or inguinal adenopathy Musc:  No cyanosis or clubbing Skin:  No rashes or ulcers, skin turgor is good Neuro:  Cranial nerves are grossly intact, no focal motor weakness, follows commands appropriately Psych:  Poor insight, oriented to person, place but not time, alert Medications Reviewed: (see below) Lab Data Reviewed: (see below) ______________________________________________________________________ Medications:  
 
Current Facility-Administered Medications Medication Dose Route Frequency  aspirin chewable tablet 81 mg  81 mg Oral DAILY  0.9% sodium chloride infusion  75 mL/hr IntraVENous CONTINUOUS  
 sodium chloride (NS) flush 5-40 mL  5-40 mL IntraVENous Q8H  
 sodium chloride (NS) flush 5-40 mL  5-40 mL IntraVENous PRN  
 acetaminophen (TYLENOL) tablet 650 mg  650 mg Oral Q4H PRN  prochlorperazine (COMPAZINE) injection 10 mg  10 mg IntraVENous Q6H PRN  
 enoxaparin (LOVENOX) injection 40 mg  40 mg SubCUTAneous Q24H  carbidopa-levodopa (SINEMET)  mg per tablet 1.5 Tab  1.5 Tab Oral QID  carbidopa-levodopa ER (SINEMET CR)  mg per tablet 1 Tab  1 Tab Oral QHS  fludrocortisone (FLORINEF) tablet 100 mcg  0.1 mg Oral DAILY  HYDROcodone-acetaminophen (NORCO)  mg tablet 1 Tab  1 Tab Oral Q6H PRN Lab Review:  
 
Recent Labs  
  04/24/19 0419 04/23/19 
0252 04/22/19 
1131 WBC 3.6* 5.2 9.4 HGB 8.9* 7.6* 10.0* HCT 29.3* 24.9* 33.1*  
 204 266 Recent Labs  
  04/24/19 
0419 04/23/19 
0252 04/22/19 
1131  138 135* K 3.5 3.2* 3.5  105 100 CO2 27 27 30 GLU 76 81 94 BUN 13 19 18 CREA 0.43* 0.48* 0.60* CA 8.3* 7.6* 8.6 MG  --  1.6  --   
PHOS  --  2.7  --   
ALB  --   --  3.1* TBILI  --   --  0.7 SGOT  --   --  6* ALT  --   --  <6* Lab Results Component Value Date/Time Glucose (POC) 82 04/23/2019 01:05 PM  
 
No results for input(s): PH, PCO2, PO2, HCO3, FIO2 in the last 72 hours. No results for input(s): INR in the last 72 hours. No lab exists for component: INREXT No results found for: SDES Lab Results Component Value Date/Time Culture result: NO GROWTH 2 DAYS 04/22/2019 11:31 AM  
 
 
 
  
Assessment:  
 
Principal Problem: 
  Loss of consciousness ([address] (4/22/2019) Active Problems: [facility] (4/22/2019) HTN (hypertension) (4/22/2019) Abnormal urine sediment (4/22/2019) Debility (4/22/2019) Anemia (4/24/2019) Plan:  
 
Principal Problem: 
  Loss of consciousness ([facility] (4/22/2019) - suspect these are syncopal episodes due to dysautonomia related to Parkinsons (patient was already on Florinef) - Orthostatics ordered but I don't see documentation of them being done, I suspect due to patient frailty - MRI did show subacute CVA per Dr. Yonas Vogel; continue ASA and given ipsilateral >70% carotid disease, outpatient follow up with Neurointerventional Radiology per Neurology Active Problems: 
  Park[facility] (4/22/2019) - progressive, continue meds 
 - wife does not understand the progressive nature of her 's disease 
 - she is not able to care for him adequately - I suspect she has significant dementia of her own; she does not remember anything from our interaction in the ED even after being reminded of specific details HTN (hypertension) (4/22/2019) 
 - off meds, would allow supine HTN and would not treat unless has symptomatic HTN Abnormal urine sediment (4/22/2019) 
 - urine culture negative, likely due to dehydration Debility (4/22/2019) 
 - PT/OT, may progress some but I would say that he needs LTC at this point, as I suspect his wife does, too 
 - they could probably get along in an [facility] but I do not know if they have the resources for that Anemia (4/24/2019) - Hgb up to 8.9 without transfusion, suspect yesterday's value was spurious Total time spent in patient care: 25 minutes Care Plan discussed with: Patient, Family, Care Manager, Nursing Staff and Dr. Yonas Vogel Discussed:  Code Status, Care Plan and D/C Planning Prophylaxis:  Lovenox Disposition:  SNF/LTC 
        
___________________________________________________ Attending Physician: Arnav Arora MD

## 2019-04-24 NOTE — PROGRESS NOTES
Palliative Medicine Consult Patient Name: J Carlos Burton YOB: 1935 Date of Initial Consult: 4/23/2019 Reason for Consult: Goals of Care Discussion Requesting Provider: Dr. Yuliana Queen Primary Care Physician: Briseyda Quigley MD 
  
 SUMMARY:  
J Carlos Burton is a 80 y. o. with a past history of Lung CA, Colon CA s/p colostomy, prostate CA s/p prostatectomy, Pakinsons Disease (sees [facility]), HTN, Chronic left elbow pain, who was admitted on 4/22/2019 from home after being less responsive and w/ altered mental status. MRI brain reading w/out acute events, but per neuro does show subacute CVA and dopplers w/ >70% on L, to f/u with [facility] as outpatient. Current medical issues leading to Palliative Medicine involvement include: Goals of Care discussion. Per neuro notes from [address] [place] [address] 4/2019: Pt w/ incr stuttering, swallowing harder, fine motor tasks worse, using walker. PALLIATIVE DIAGNOSES:  
1. Parkinson's disease 2. Advanced Care Planning discussion 3. Goals of care Discussion 4. AMS, unspecified 5. Weakness, generalized PLAN:  
1. Meet w/ pt and wife along w/ Mirela Garcias LCSW who met wife yest . 2. While wife and pt have noted to have some memory recall issues and to be poor historians, wife is able to acknowledge that Parkinson's disease is a chronic condition that worsens w/ time. 3. Goals: Goals are clear now for full restorative efforts, then rehab to get stronger and as functional as he can be. Discussed >70% L sided ICA disease, f/u with neurointerventional once more stable. Wife focuses on pt's heart healthy diet for a while- spend some time talking about nutrition, importance on eating to maintain strength thus diet has been changed to regular diet which pt is more apt to eat.   
4. After rehab they would like home based primary care- have been unhappy w/ another service and will be coming to [facility] for all hospital stays if they can- thus makes sense to have [facility] home based team involved. Referral placed. 5. Code status: As yest w/ our colleague Asya, today pt and wife very clear about DNR status w/ no CPR/shock or intubation- actually for both of them. Support this medically given progressive chronic illnesses and DDNR signed by pt. 6. Advanced Care Planning Discussion- Patient does not have AMD on file. Patient is , his wife Prudence Nyhan, [phone], legal NOK. · Patients wife stated that patient completed and AMD type document and she is his mPOA. Have asked her to bring in copy. 7. Communicated plan of care with: Palliative IDT 
 
 
 GOALS OF CARE / TREATMENT PREFERENCES:  
[====Goals of Care====] GOALS OF CARE: 
Patient/Health Care Proxy Stated Goals: Rehabilitation(Improve function, get stronger) TREATMENT PREFERENCES:  
Code Status: DNR-DDNR signed Advance Care Planning: 
Advance Care Planning 4/23/2019 Patient's Healthcare Decision Maker is: Verbal statement (Legal Next of Kin remains as decision maker) Confirm Advance Directive Yes, not on file Patient Would Like to Complete Advance Directive - Medical Interventions: Limited additional interventions Other Instructions:  
Artificially Administered Nutrition: (did not address) The palliative care team has discussed with patient / health care proxy about goals of care / treatment preferences for patient. 
[====Goals of Care====] HISTORY:  
 
 
 
CHIEF COMPLAINT: fatigue HPI/SUBJECTIVE: The patient is:  
[x] Verbal and participatory [] Non-participatory due to:  
 
Pt w/out complaints ,feels fatigued. Wife has concerns about diet, therapies and more. NAD. 4/23- ECHO, EEG, US carotid and MRI all pending results, neuro consulted, completed initial today Clinical Pain Assessment (nonverbal scale for severity on nonverbal patients):  
Clinical Pain Assessment Severity: 0 Location: right elbow Character: sore Duration: years Effect: hurts Factors: position Frequency: chronic Adult Nonverbal Pain Scale Face: No particular expression or smile Activity (Movement): Lying quietly, normal position Guarding: Lying quietly, no positioning of hands over areas of body Physiology (Vital Signs): Stable vital signs Respiratory: Baseline RR/SpO2 compliant with ventilator Total Score: 0 
 
 
 FUNCTIONAL ASSESSMENT:  
 
Palliative Performance Scale (PPS): PPS: 40 PSYCHOSOCIAL/SPIRITUAL SCREENING:  
 
Advance Care Planning: 
Advance Care Planning 4/23/2019 Patient's Healthcare Decision Maker is: Verbal statement (Legal Next of Kin remains as decision maker) Confirm Advance Directive Yes, not on file Patient Would Like to Complete Advance Directive - Any spiritual / Restorationism concerns: 
[] Yes /  [x] No 
 
Caregiver Burnout: 
[] Yes /  [x] No /  [] No Caregiver Present Anticipatory grief assessment:  
[x] Normal  / [] Maladaptive ESAS Anxiety: Anxiety: 0 
 
ESAS Depression: Depression: 0 REVIEW OF SYSTEMS:  
 
Positive and pertinent negative findings in ROS are noted above in HPI. The following systems were [x] reviewed / [] unable to be reviewed as noted in HPI Other findings are noted below. Systems: constitutional, ears/nose/mouth/throat, respiratory, gastrointestinal, genitourinary, musculoskeletal, integumentary, neurologic, psychiatric, endocrine. Positive findings noted below. Modified ESAS Completed by: provider Fatigue: 5 Drowsiness: 0 Depression: 0 Pain: 0 Anxiety: 0 Nausea: 0 Anorexia: 3 Dyspnea: 0 PHYSICAL EXAM:  
 
From RN flowsheet: 
Wt Readings from Last 3 Encounters:  
04/23/19 170 lb (77.1 kg) 04/22/19 170 lb (77.1 kg)  
11/21/18 164 lb (74.4 kg) Blood pressure 143/66, pulse 77, temperature 97.3 °F (36.3 °C), resp. rate 16, height 5' 10\" (1.778 m), weight 170 lb (77.1 kg), SpO2 94 %. Pain Scale 1: Numeric (0 - 10) Pain Intensity 1: 0 Pain Location 1: Generalized Pain Description 1: Aching Pain Intervention(s) 1: Medication (see MAR) Last bowel movement, if known:  
 
Constitutional: well nourished, awake, pleasant, NAD Eyes: pupils equal, anicteric ENMT: no nasal discharge, moist mucous membranes Respiratory: breathing not labored, symmetric Gastrointestinal: soft non-tender, +colostomy Musculoskeletal: L shoulder decr ROM Skin: warm, dry Neurologic:weak, following commands, moving all extremities Psychiatric: flat or mask like affect (PD) no hallucinations Other: 
 
 
 HISTORY:  
 
Principal Problem: 
  Loss of consciousness ([facility] [address] (4/22/2019) Active Problems: 
  Parkinson disease ([address] (4/22/2019) HTN (hypertension) (4/22/2019) Abnormal urine sediment (4/22/2019) Debility (4/22/2019) Anemia (4/24/2019) Past Medical History:  
Diagnosis Date  'light-for-dates' infant with signs of fetal malnutrition  Anemia 4/24/2019  Colon cancer ([facility] [address]  Hypertension  Lung cancer ([facility] [address]  Parkinson disease ([facility] [address]  Prostate cancer ([facility] [address] Past Surgical History:  
Procedure Laterality Date  HX COLOSTOMY  HX PROSTATECTOMY Family History Family history unknown: Yes History reviewed, no pertinent family history. Social History Tobacco Use  Smoking status: Former Smoker Start date: 11/21/2010  Smokeless tobacco: Never Used Substance Use Topics  Alcohol use: Yes Alcohol/week: 0.6 oz Types: 1 Cans of beer per week Frequency: Never Comment: occ No Known Allergies Current Facility-Administered Medications Medication Dose Route Frequency  aspirin chewable tablet 81 mg  81 mg Oral DAILY  0.9% sodium chloride infusion  75 mL/hr IntraVENous CONTINUOUS  
 sodium chloride (NS) flush 5-40 mL  5-40 mL IntraVENous Q8H  
 sodium chloride (NS) flush 5-40 mL  5-40 mL IntraVENous PRN  
  acetaminophen (TYLENOL) tablet 650 mg  650 mg Oral Q4H PRN  prochlorperazine (COMPAZINE) injection 10 mg  10 mg IntraVENous Q6H PRN  
 enoxaparin (LOVENOX) injection 40 mg  40 mg SubCUTAneous Q24H  carbidopa-levodopa (SINEMET)  mg per tablet 1.5 Tab  1.5 Tab Oral QID  carbidopa-levodopa ER (SINEMET CR)  mg per tablet 1 Tab  1 Tab Oral QHS  fludrocortisone (FLORINEF) tablet 100 mcg  0.1 mg Oral DAILY  HYDROcodone-acetaminophen (NORCO)  mg tablet 1 Tab  1 Tab Oral Q6H PRN  
 
 
 
 LAB AND IMAGING FINDINGS:  
 
Lab Results Component Value Date/Time WBC 3.6 (L) 04/24/2019 04:19 AM  
 HGB 8.9 (L) 04/24/2019 04:19 AM  
 PLATELET 312 30/06/4238 04:19 AM  
 
Lab Results Component Value Date/Time Sodium 138 04/24/2019 04:19 AM  
 Potassium 3.5 04/24/2019 04:19 AM  
 Chloride 105 04/24/2019 04:19 AM  
 CO2 27 04/24/2019 04:19 AM  
 BUN 13 04/24/2019 04:19 AM  
 Creatinine 0.43 (L) 04/24/2019 04:19 AM  
 Calcium 8.3 (L) 04/24/2019 04:19 AM  
 Magnesium 1.6 04/23/2019 02:52 AM  
 Phosphorus 2.7 04/23/2019 02:52 AM  
  
Lab Results Component Value Date/Time AST (SGOT) 6 (L) 04/22/2019 11:31 AM  
 Alk. phosphatase 78 04/22/2019 11:31 AM  
 Protein, total 6.5 04/22/2019 11:31 AM  
 Albumin 3.1 (L) 04/22/2019 11:31 AM  
 Globulin 3.4 04/22/2019 11:31 AM  
 
No results found for: INR, PTMR, PTP, PT1, PT2, APTT Lab Results Component Value Date/Time Iron 74 04/24/2019 04:19 AM  
 TIBC 222 (L) 04/24/2019 04:19 AM  
 Iron % saturation 33 04/24/2019 04:19 AM  
 Ferritin 142 04/24/2019 04:19 AM  
  
No results found for: PH, PCO2, PO2 No components found for: [place] No results found for: CPK, CKMB Total time: 35 min Counseling / coordination time, spent as noted above: 30 min 
> 50% counseling / coordination?: yes Prolonged service was provided for  []30 min   []75 min in face to face time in the presence of the patient, spent as noted above. Note: this can only be billed with 73623 (initial) or 52471 (follow up). If multiple start / stop times, list each separately.

## 2019-04-24 NOTE — PROGRESS NOTES
4/24/2019 Reason for Admission:   LOC, CVA w/u RRAT Score:      10 Plan for utilizing home health:   No    
                 
Current Advanced Directive/Advance Care Plan:  Full Code; Adv. Care Plan not on file Likelihood of Readmission:  Low/green 4/24/2019   CARE MANAGEMENT NOTE:  CM is following pt for initial discharge planning. EMR reviewed. CM met with pt and his wife at bedside to obtain hx for this needs assessment. Reportedly, pt resides with his wife in a one story condo with one step thru the garage. Wife reports \"I hate it. \"  The condo is currently For Sale. The couple would like to move to [facility]. There are two dtrs one lives in [facility] [place] and the closest is in [place], [place]. 
[place], pt was non-ambulatory for an unspecified amount of time, prehaps a couple of months. He uses a w/c primarily for mobility. Pt has a catheter and a colostomy. A private duty caregiver comes to the home 7 days per week for a couple hours each a.m to prepare breakfast, bath and dress pt, and get him into the w/c for the day. The caregiver returns at 9 p.m for bedtime tuck in. [facility] agency is actively providing PT,[facility]. Wife reports that she is unwilling to maintain pt's ostomy changes however home health only provides teaching/education before discontinuing skilled nursing. DME in the home includes a hospital bed, rolling walker, w/c, and a chair lift recliner. Pt has RX drug coverage and [facility] 
 pharmacy at [facility] is preferred. PCP was Dr. Shandra Cortez however pt now has Dr. Clement Celeste. Wife reports that he is difficult to contact. Transition of Care Plan: 1. CM discussed SNF as an option for short term rehab. Wife was given a list of facilities and the following were selected:  ([facility] [place]).  
2.  Pt's status changed from OBS to inpt on 4/23 so a three night qualifying stay is needed (Friday will be the earliest discharge date to SNF). 3.  CM will provide community resources for additional private duty agencies should wife want to increase caregiver hours, and a list of Assisted Living facilities for future reference. CM will continue to follow pt for discharge planning. Praveena

## 2019-04-24 NOTE — ACP (ADVANCE CARE PLANNING)
Palliative medicine Along with Po De Jesus LCSW meet w/ pt and wife Danielle. Discuss progressive nature of Parkinson's disease and code/resucitiation status. They are both very clear that pt would not want CPR/shock/intubation in the event of cardiopulmonary arrest. DDNR signed. Pt apparently has AMD on file naming wife Danielle as [facility], asked her to bring it in, she is [facility] however.

## 2019-04-24 NOTE — PROGRESS NOTES
Problem: Dysphagia (Adult) Goal: *Acute Goals and Plan of Care (Insert Text) Description Swallowing goals initiated 4-24-19:  
1) tolerate regular, thins without s/s aspiration by 5-1-19 4/24/2019 1451 by Roberto Hawkins, SLP Outcome: Progressing Towards Goal 
4/24/2019 1451 by Roberto Hawkins, SLP Outcome: Progressing Towards Goal 
 SPEECH LANGUAGE PATHOLOGY BEDSIDE SWALLOW EVALUATION Patient: Lillie Dobbs (34 y.o. male) Date: 4/24/2019 Primary Diagnosis: Loss of consciousness ([facility] [address] [R40.20] Loss of consciousness ([facility] [address] [R40.20] Precautions: aspiration  Fall ASSESSMENT : 
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 Mild oral-pharyngeal dysphagia. Patient admits slow insidious onset of dysarthria for a year and a half. Admitted with AMS, L elbow pain, drooling. PMH:  lung CA,  colon Ca colostomy, prostate CA with prostatectomy, PD, HTN< +tobacco, +ETOH. . 
 
Patient will benefit from skilled intervention to address the above impairments. Patient?s rehabilitation potential is considered to be Good Factors which may influence rehabilitation potential include: ? None noted ? Mental ability/status ? Medical condition ? Home/family situation and support systems ? Safety awareness ? Pain tolerance/management ? Other: PLAN : 
Recommendations and Planned Interventions: 
Ok for diet as tolerated. Frequency/Duration: Patient will be followed by speech-language pathology 1 time a week to address goals. Discharge Recommendations: [place] SUBJECTIVE:  
Patient stated ?we are having some trouble with the IRS. My wife is not able to handle it? .-after he had to have a long conversation on the phone with wife and someone else about a check. OBJECTIVE:  
 
Past Medical History:  
Diagnosis Date 'light-for-dates' infant with signs of fetal malnutrition Anemia 4/24/2019 Colon cancer ([facility] [address] Hypertension Lung cancer ([facility] [address] Parkinson disease ([facility] [address] Prostate cancer ([facility] Past Surgical History:  
Procedure Laterality Date HX COLOSTOMY HX PROSTATECTOMY Prior Level of Function/Home Situation:  
Home Situation Home Environment: Private residence # Steps to Enter: 0 One/Two Story Residence: One story Living Alone: No 
Support Systems: Home care staff, Spouse/Significant Other/Partner(7:30- 9pm daily) Patient Expects to be Discharged to[de-identified] Private residence Current DME Used/Available at Home: Cane, straight, Commode, bedside, Lift chair, Wheelchair, Marilee Severe, rolling, Walker, rollator Tub or Shower Type: Shower Diet prior to admission: regular, thins Current Diet:  regular, thins Cognitive and Communication Status: 
Neurologic State: Alert Orientation Level: Oriented to person, Oriented to place Cognition: Follows commands, Impaired decision making, Memory loss Perception: Appears intact Perseveration: No perseveration noted Safety/Judgement: Lack of insight into deficits Oral Assessment: 
Oral Assessment Labial: (masked facies) Dentition: Limited;Natural 
Oral Hygiene: x Lingual: (x) Mandible: (x) 
P.O. Trials: 
  
Vocal quality prior to P.O.: (x) Consistency Presented: (x) How Presented: (x) ORAL PHASE:  
Difficulty feeding self due to PD Slow chew, suspect still reduced. PHARYNGEAL PHASE:  
Moderate pharyngeal phase weakness Difficult to palpate swallow. Mild delay. Intermittent throat clearing. Patient reports dysphagia at home-only \"at night\" when he is tired. We discussed dysphagia and dysarthria with PD and how to improve control of dysphagia with sinemet dosing 1 hour before meals.   
  
  
  
 SPEECH:  
Patient with hypokinetic dysarthria: fast rate, mild consonant spirantization, low volume, intermittent initial sound repetition. Speech intelligibility 70% Dysarthria levels varied Wife states that speech is new and neuro told her it was due to a tiny CVa. Patient reports a year and a half of dysarthria. NOMS:  
The NOMS functional outcome measure was used to quantify this patient's level of swallowing impairment. Based on the NOMS, the patient was determined to be at level 5 for swallow function NOMS Swallowing Levels: 
Level 1 (CN): NPO Level 2 (CM): NPO but takes consistency in therapy Level 3 (CL): Takes less than 50% of nutrition p.o. and continues with nonoral feedings; and/or safe with mod cues; and/or max diet restriction Level 4 (CK): Safe swallow but needs mod cues; and/or mod diet restriction; and/or still requires some nonoral feeding/supplements Level 5 (CJ): Safe swallow with min diet restriction; and/or needs min cues Level 6 (CI): Independent with p.o.; rare cues; usually self cues; may need to avoid some foods or needs extra time Level 7 (CH): Independent for all p.o. HELEN. (2003). National Outcomes Measurement System (NOMS): Adult Speech-Language Pathology User's Guide. Pain: 
Pain Scale 1: Numeric (0 - 10) Pain Intensity 1: 0 After treatment:  
?            Patient left in no apparent distress sitting up in chair ? Patient left in no apparent distress in bed ? Call bell left within reach ? Nursing notified ? Caregiver present ? Bed alarm activated COMMUNICATION/EDUCATION:  
The patient?s plan of care including recommendations, planned interventions, and recommended diet changes were discussed with: Registered Nurse. Patient was educated regarding His deficit(s) of mild dysphagia , mild to moderate dysarthria as this relates to His diagnosis of possible small CVA, PD. He demonstrated Good understanding as evidenced by discussion. Aakash Leiva ?            Posted safety precautions in patient's room. ? Patient/family have participated as able in goal setting and plan of care. ?            Patient/family agree to work toward stated goals and plan of care. ?            Patient understands intent and goals of therapy, but is neutral about his/her participation. ? Patient is unable to participate in goal setting and plan of care.  
 
Thank you for this referral. 
Regan Payne, SLP

## 2019-04-24 NOTE — PROGRESS NOTES
Palliative Medicine Code Status: DNR Advance Care Planning: 
Advance Care Planning 4/24/2019 Patient's Healthcare Decision Maker is: Verbal statement (Legal Next of Kin remains as decision maker): Wife Kathy Kinney Confirm Advance Directive Yes, not on file Patient Would Like to Complete Advance Directive Already in place Does the patient have other document types Durable Do Not Resuscitate Patient / Family Encounter Documentation Participants (names): Pt, wife Dennys Quiroga (Dr. Gallo Pro, [facility]) Narrative: Follow up visit to pt and wife. Pt was awake in bed, pleasant, no signs of distress noted. Wife reports pt was unable to order a ham sandwich for lunch due to Cardiac diet, expressed preference for pt to eat a less healthy diet than nothing at all, reports pt is not interested in the heart healthy meal choices. Concerns relayed to Attending Physician; diet was changed per wife request.  Discussed with SLP and RD as well. Code status was readdressed, as discussed during 4/23 visit. Wife had questions re: DNR; Dr. Gallo Pro educated pt and wife re: potential burdens associated with attempts at resuscitation in the context of advanced age/medical condition. Pt elected to have DNR order in place, signed DDNR on this date. Original was placed on chart to be sent out with pt at time of discharge, copy was placed on chart to be scanned into medical record, copy was returned to wife. Wife confirmed that she would be interested in changing from current PCP to [address] [place] [address] team, stated she liked the individuals associated with prior group but expressed frustration with response time/lack of return calls. Psychosocial Issues Identified/ Resilience Factors:  Caregiver stress, wife tends to focus on the negative in situations though did express some positives today. Goals of Care / Plan: Wife reports plan is for short term stay at SNF for strength building with goal of returning home. Pt has been referred to [facility] per wife's request; KENNETH updated Clinical Referral Navigator Candace luong re: plan of care. Code status was readdressed on this date, DDNR was completed to reflect pt's wishes. Palliative team will continue to follow for support. Thank you for including Palliative Medicine in the care of Mr. Estuardo Garcia. Yocasta Negron LCSW, [facility] 
[id number] [phone])

## 2019-04-24 NOTE — PROGRESS NOTES
[facility] [facility]: [facility] Neurology [facility] [address] [facility] [phone] Name:   Abby Grossman Medical record #: [id number] Admission Date: 4/22/2019 Reason for Consult:  AMS Subjective:  
Overnight events: 
 
Acute ischemic stroke visible on DWI Objective: EEG: 
 
 
Assessment/Plan: 1. Altered Mental Status: · Neurochecks:  Every 4 hours · Folate, B12, Ammonia all unremarkable · EEG to rule out subclinical seizures · Follow up with Dr. Dmitriy Samaniego or Carmen Taylor for further Parkinson's management 
  
2. Mobility:  
· Has been OOB. · PT/OT to [facility] for rehab 
  
4. Diet:   
· Does not need SLP, passed STAND  
  
5. VTE Prophylaxes: · Per primary team  
  Thank you for allowing the Neurology Service the pleasure of participating in the care of your patient. This patient will be discussed with my collaborating care team physician Dr. Amrik Fofana and he may have further recommendations regarding this patient's care.   
  
  
   
Attending Attestation:  
  
NEUROLOGY NOTE ADDENDUM: 
  
4/23/2019 
  
  
I have reviewed the documentation provided by the nurse practitioner, discussed her findings, clinical impression, and the proposed management plans with regards to this patient's encounter. I have personally performed a face to face diagnostic evaluation on this patient. My findings are as follows: 
  
Abby Grossman is a 80 y.o. male who  has a past medical history of Colon cancer ([facility] [address] Hypertension, Lung cancer ([facility] [address] Parkinson disease ([facility] [address] and Prostate cancer ([facility] [address] who presents for evaluation of AMS 
  
Mr. Connie Quinones presented to the ED with chief complaint of altered mental status and hypotension. His wife reports that she found him drooling, with his head forward, eyes closed\" and \"not responsive\" to her shaking him or trying to open his eyes. Spouse reports that she called EMS.  EMS personnel report that the spouse told them the patient \"isn't right\". They state that patient became hypotensive en route.  
  
Patient follows up at [facility] for parkinson's disease on Sinemet. 
  
(+) 1-2 weeks new onset L wrist drop 
  
Exam: 
Visit Vitals /74 (BP 1 Location: Left arm, BP Patient Position: At rest) Pulse 62 Temp 97.7 °F (36.5 °C) Resp 16 Ht 5' 10\" (1.778 m) Wt 77.1 kg (170 lb) SpO2 98% BMI 24.39 kg/m²  
  
Gen: Awake, alert, follows commands Appropriate appearance, normal speech. No visual field defect on confrontation exam. 
Full eyes movement, with no nystagmus, no diplopia, no ptosis. Normal gag and swallow. All remaining cranial nerves were normal 
Motor function: (+) L wrist drop Rest is 5/5 
(+) masked facies (+) rigidity 
(+) bradykinesia Sensory: intact to LT 
DTRs + in all extremities, (-) Babinski Good FTN and HTS Gait: Deferred 
  
  
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 
  
Plan 1. I personally reviewed the MRI brain images. It was initially reported as normal, but upon my review, I noted acute punctate strokes in the L cortical area. Radiology was informed 2. Will put on ASA 81 
3. Needs EMG/NCS of the L UE to evaluate for L radial neuropathy which can be done as an out patient c/o his neurologist at [facility] 4. Physical therapy/ Occupational therapy 5. Needs to follow up with a vascular surgeon regarding L ICA stenosis 6. LDL 73.8 but patient developed generalized weakness to statins so will not put on statin medications 
  
  
Thank you for the consultation. 
  
  
Dona Umaña MD 
Diplomate, American Board of Psychiatry and Neurology Diplomate, Neuromuscular Medicine Diplomate, [address] [facility] [place] Board of Electrodiagnostic Medicine 
  
   
 
 
 
 
Physical Exam: 
 
General:  Alert, cooperative, no acute distress. Lungs:   Clear to auscultation bilaterally. No crackles/wheezes. Heart: 
Abdominal:  Regular rate and rhythm, No murmur, click, rub or gallop. Soft and nondistended Skin: Skin color, texture, turgor normal.   
NEUROLOGICAL EXAM: 
 
General:  Alert, cooperative, no acute distress. Lungs:   Clear to auscultation bilaterally. No crackles/wheezes. Heart: 
Abdominal:  Regular rate and rhythm, No murmur, click, rub or gallop. Soft and nondistended Skin: Skin color, texture, turgor normal.   
  
NEUROLOGICAL EXAM: 
  
Appearance:  Well developed, well nourished,  and is in no acute distress. Mental Status: Oriented to time, place and person. Fully attentive. No aphasia. Full fund of knowledge. Normal recent and remote memory. Cranial Nerves:   Intact visual fields. PERRL, EOM's full, no nystagmus, no ptosis. Facial sensation is normal. Facial movement is symmetric. Palate is midline. Normal sternocleidomastoid strength. Tongue is midline. Reflexes:   Deep tendon reflexes +1 on left and normal on the right Sensory:   Normal to temperature and vibration. Gait:  Not tested. Tremor:   Left sided tremor, bradykinetic, Cerebellar:  No cerebellar signs present. Motor: No pronator drift of either outstretched arm. Left sided wrist droop 
  
  
 
 
 
Current Facility-Administered Medications Medication Dose Route Frequency Provider Last Rate Last Dose  aspirin chewable tablet 81 mg  81 mg Oral DAILY Ileana MUHAMMAD NP   81 mg at 04/23/19 1309  
 0.9% sodium chloride infusion  75 mL/hr IntraVENous CONTINUOUS Chitra Lopez MD 75 mL/hr at 04/24/19 0111 75 mL/hr at 04/24/19 0111  
 sodium chloride (NS) flush 5-40 mL  5-40 mL IntraVENous Q8H Chitra Lopez MD   10 mL at 04/24/19 1102  sodium chloride (NS) flush 5-40 mL  5-40 mL IntraVENous PRN Isa Chauhan MD      
 acetaminophen (TYLENOL) tablet 650 mg  650 mg Oral Q4H PRN Isa Chauhan MD      
 prochlorperazine (COMPAZINE) injection 10 mg  10 mg IntraVENous Q6H PRN Isa Chauhan MD   10 mg at 04/24/19 0246  enoxaparin (LOVENOX) injection 40 mg  40 mg SubCUTAneous Q24H Isa Chauhan MD   40 mg at 04/23/19 2139  carbidopa-levodopa (SINEMET)  mg per tablet 1.5 Tab  1.5 Tab Oral QID Isa Chauhan MD   1.5 Tab at 04/24/19 0396  carbidopa-levodopa ER (SINEMET CR)  mg per tablet 1 Tab  1 Tab Oral QHS Isa Chauhan MD   1 Tab at 04/23/19 2007  fludrocortisone (FLORINEF) tablet 100 mcg  0.1 mg Oral DAILY Isa Chauhan MD   100 mcg at 04/23/19 0927  
 HYDROcodone-acetaminophen (NORCO)  mg tablet 1 Tab  1 Tab Oral Q6H PRN Isa Chauhan MD   1 Tab at 04/23/19 2007 Recent Results (from the past 24 hour(s)) ECHO ADULT COMPLETE Collection Time: 04/23/19 12:01 PM  
Result Value Ref Range Ao Root D 3.85 cm Aortic Valve Systolic Peak Velocity 363. 71 cm/s Aortic Valve Area by Continuity of Peak Velocity 1.8 cm2 AoV PG 8.8 mmHg LVIDd 4.61 4.2 - 5.9 cm  
 LVPWd 1.22 (A) 0.6 - 1.0 cm LVIDs 3.34 cm IVSd 1.08 (A) 0.6 - 1.0 cm  
 LVOT d 1.98 cm  
 LVOT Peak Velocity 85.99 cm/s LVOT Peak Gradient 3.0 mmHg MV A Andrew 85.35 cm/s  
 MV E Andrew 71.63 cm/s  
 MV E/A 0.84 Left Atrium to Aortic Root Ratio 0.72   
 RVIDd 3.52 cm  
 LA Vol 4C 49.14 18 - 58 mL  
 LV Mass .1 (A) 88 - 224 g LV Mass AL Index 117.1 49 - 115 g/m2 Mitral Regurgitant Peak Velocity 286.10 cm/s Mitral Valve E Wave Deceleration Time 214.8 ms Left Atrium Major Axis 2.79 cm Triscuspid Valve Regurgitation Peak Gradient 29.3 mmHg Pulmonic Valve Max Velocity 64.38 cm/s  
 TR Max Velocity 270.69 cm/s  
 LA Vol Index 25.23 16 - 28 ml/m2 MR Peak Gradient 32.7 mmHg PV peak gradient 1.7 mmHg DUPLEX CAROTID BILATERAL Collection Time: 04/23/19 12:32 PM  
Result Value Ref Range Right subclavian sys 83.4 cm/s RIGHT SUBCLAVIAN ARTERY D 0.00 cm/s Right cca dist sys 73.7 cm/s Right CCA dist alonzo 15.5 cm/s Right CCA prox sys 73.7 cm/s Right CCA prox alonzo 15.5 cm/s Right eca sys 102.7 cm/s RIGHT EXTERNAL CAROTID ARTERY D 0.00 cm/s Right ICA dist sys 89.8 cm/s Right ICA dist alonzo 28.4 cm/s Right ICA mid sys 85.0 cm/s Right ICA mid alonzo 23.6 cm/s Right ICA prox sys 88.2 cm/s Right ICA prox alonzo 22.0 cm/s Right vertebral sys 43.0 cm/s RIGHT VERTEBRAL ARTERY D 12.28 cm/s Right ICA/CCA sys 1.2 Left subclavian sys 110.1 cm/s LEFT SUBCLAVIAN ARTERY D 0.00 cm/s Left CCA dist sys 55.6 cm/s Left CCA dist alonzo 15.0 cm/s Left CCA prox sys 49.0 cm/s Left CCA prox alonzo 11.7 cm/s Left ECA sys 88.6 cm/s LEFT EXTERNAL CAROTID ARTERY D 6.16 cm/s Left ICA dist sys 60.7 cm/s Left ICA dist alonzo 22.0 cm/s Left ICA mid sys 94.7 cm/s Left ICA mid alonzo 30.0 cm/s Left ICA prox sys 383.7 cm/s Left ICA prox alonzo 79.8 cm/s Left vertebral sys 62.4 cm/s LEFT VERTEBRAL ARTERY D 13.89 cm/s Left ICA/CCA sys 6.90 GLUCOSE, POC Collection Time: 04/23/19  1:05 PM  
Result Value Ref Range Glucose (POC) 82 65 - 100 mg/dL Performed by Freya Bueno (PCT) AMMONIA Collection Time: 04/23/19  4:13 PM  
Result Value Ref Range Ammonia 13 <32 UMOL/L  
CBC W/O DIFF Collection Time: 04/24/19  4:19 AM  
Result Value Ref Range WBC 3.6 (L) 4.1 - 11.1 K/uL  
 RBC 3.65 (L) 4.10 - 5.70 M/uL HGB 8.9 (L) 12.1 - 17.0 g/dL HCT 29.3 (L) 36.6 - 50.3 % MCV 80.3 80.0 - 99.0 FL  
 MCH 24.4 (L) 26.0 - 34.0 PG  
 MCHC 30.4 30.0 - 36.5 g/dL  
 RDW 18.0 (H) 11.5 - 14.5 % PLATELET 410 977 - 558 K/uL MPV 10.6 8.9 - 12.9 FL  
 NRBC 0.0 0  WBC ABSOLUTE NRBC 0.00 0.00 - 0.01 K/uL METABOLIC PANEL, BASIC Collection Time: 19  4:19 AM  
Result Value Ref Range Sodium 138 136 - 145 mmol/L Potassium 3.5 3.5 - 5.1 mmol/L Chloride 105 97 - 108 mmol/L  
 CO2 27 21 - 32 mmol/L Anion gap 6 5 - 15 mmol/L Glucose 76 65 - 100 mg/dL BUN 13 6 - 20 MG/DL Creatinine 0.43 (L) 0.70 - 1.30 MG/DL  
 BUN/Creatinine ratio 30 (H) 12 - 20 GFR est AA >60 >60 ml/min/1.73m2 GFR est non-AA >60 >60 ml/min/1.73m2 Calcium 8.3 (L) 8.5 - 10.1 MG/DL  
RETICULOCYTE COUNT Collection Time: 19  4:19 AM  
Result Value Ref Range Reticulocyte count 1.3 0.7 - 2.1 % Absolute Retic Cnt. 0.0466 0.0260 - 0.0950 M/ul Visit Vitals /84 (BP 1 Location: Right arm, BP Patient Position: At rest) Comment: Jose C Duque RN aware Pulse 82 Temp 97.5 °F (36.4 °C) Resp 18 Ht 5' 10\" (1.778 m) Wt 77.1 kg (170 lb) SpO2 92% BMI 24.39 kg/m² O2 Device: Room air Temp (24hrs), Av.6 °F (36.4 °C), Min:97.2 °F (36.2 °C), Max:98 °F (36.7 °C) 
  701 - 1900 In: -  
Out: 1000 [Urine:1000]   1901 - 700 In: 0 [I.V.:] Out: 450 [Urine:350] Care Plan discussed with: 
Patient x Family RN Care Manager Consultant/Specialist:    
 
 
Katy Hall [facility]

## 2019-04-24 NOTE — PROGRESS NOTES
Bedside and Verbal shift change report given to Fidelia Oliva RN (oncoming nurse) by Reyes Thapa RN (offgoing nurse). Report included the following information SBAR, Kardex, MAR and Accordion.

## 2019-04-24 NOTE — PROGRESS NOTES
Bedside shift change report given to Aurelio Gimenez RN (oncoming nurse) by Sohail Maxwell RN (offgoing nurse). Report included the following information SBAR, Recent Results and Med Rec Status.

## 2019-04-24 NOTE — PROGRESS NOTES
Problem: Self Care Deficits Care Plan (Adult) Goal: *Acute Goals and Plan of Care (Insert Text) Description Occupational Therapy Goals Initiated 4/23/2019 1. Patient will perform grooming with supervision/set-up within 7 day(s). 2.  Patient will perform upper body dressing with minimal assistance/contact guard assist within 7 day(s). 3.  Patient will perform lower body dressing with moderate assistance  within 7 day(s). 4.  Patient will perform functional transfers with minimal assistance/contact guard assist within 7 day(s). 5.  Patient will participate in upper extremity therapeutic exercise/activities with supervision/set-up for 5 minutes within 7 day(s). Outcome: Progressing Towards Goal 
 OCCUPATIONAL THERAPY TREATMENT Patient: Rik Shine (41 y.o. male) Date: 4/24/2019 Diagnosis: Loss of consciousness ([address] [R40.20] Loss of consciousness ([address] [R40.20] Loss of consciousness ([address] Precautions: Fall Chart, occupational therapy assessment, plan of care, and goals were reviewed. ASSESSMENT: 
Patient received seated in chair. He is requesting to return to bed due discomfort in chair. Patient tolerated LUE ROM while awaiting 2nd person for transfer. Patient tolerated wrist flexion/extension, digit flexion/extension, elbow and shoulder flexion/extension. Patient reports shoulder pain with ROM, but denies pain at other joints. Patient requires mod assist x 2 , for sit to stand and transfer to bed. Patient requires mod assist to bring LEs into bed. Recommend SNF. Progression toward goals: 
?       Improving appropriately and progressing toward goals ? Improving slowly and progressing toward goals ? Not making progress toward goals and plan of care will be adjusted PLAN: 
Patient continues to benefit from skilled intervention to address the above impairments. Continue treatment per established plan of care. Discharge Recommendations:  Chapito Caal Further Equipment Recommendations for Discharge:  TBD SUBJECTIVE:  
Patient stated ? Sounds fine. ? OBJECTIVE DATA SUMMARY:  
Cognitive/Behavioral Status: 
Neurologic State: Alert Orientation Level: Oriented to person;Oriented to place Cognition: Follows commands; Impaired decision making;Memory loss Perception: Appears intact Perseveration: No perseveration noted Safety/Judgement: Lack of insight into deficits Functional Mobility and Transfers for ADLs: 
Bed Mobility: 
Rolling: Moderate assistance Sit to Supine: Moderate assistance;Maximum assistance Scooting: Moderate assistance;Assist x2 Transfers: 
Sit to Stand: Assist x2; Moderate assistance Bed to Chair: Moderate assistance;Assist x2 Balance: 
Sitting: High guard Standing: Impaired; With support Standing - Static: Constant support; Fair 
Standing - Dynamic : Poor;Constant support ADL Intervention: 
  
 
  
 
  
 
  
 
  
 
  
 
  
 
Cognitive Retraining Safety/Judgement: Lack of insight into deficits Neuro Re-Education: 
  
  
  
Therapeutic Exercises:  
Pain: 
Pain Scale 1: Numeric (0 - 10) Pain Intensity 1: 0 Activity Tolerance: VSS Please refer to the flowsheet for vital signs taken during this treatment. After treatment:  
? Patient left in no apparent distress sitting up in chair ? Patient left in no apparent distress in bed 
? Call bell left within reach ? Nursing notified ? Caregiver present ? Bed alarm activated COMMUNICATION/COLLABORATION:  
The patient?s plan of care was discussed with: Registered Nurse Carlos Phan OTR/L Time Calculation: 25 mins

## 2019-04-24 NOTE — PROGRESS NOTES
Problem: Mobility Impaired (Adult and Pediatric) Goal: *Acute Goals and Plan of Care (Insert Text) Description Physical Therapy Goals Initiated 4/23/2019 1. Patient will move from supine to sit and sit to supine  in bed with minimal assistance/contact guard assist within 7 day(s). 2.  Patient will transfer from bed to chair and chair to bed with minimal assistance/contact guard assist using the least restrictive device within 7 day(s). 3.  Patient will perform sit to stand with minimal assistance/contact guard assist within 7 day(s). 4.  Patient will ambulate with minimal assistance/contact guard assist for 100 feet with the least restrictive device within 7 day(s). Note: PHYSICAL THERAPY TREATMENT Patient: Gita Peng (71 y.o. male) Date: 4/24/2019 Diagnosis: Loss of consciousness ([address] [R40.20] Loss of consciousness ([address] [R40.20] Loss of consciousness ([address] Precautions: Fall Chart, physical therapy assessment, plan of care and goals were reviewed. ASSESSMENT: 
Pt comes to sit with mod to max assist.Pt CGA to min assist sitting on EOB. Pt to stand with mod assist of 2. Pt Pt unsteady standing hand held of 2. Pt took 3 steps to bedside chair with mod assist of 2 hand held. Pt left sitting with chair alarm in place. Pt progress slow. Continue goals. Progression toward goals: 
?    Improving appropriately and progressing toward goals ? Improving slowly and progressing toward goals ? Not making progress toward goals and plan of care will be adjusted PLAN: 
Patient continues to benefit from skilled intervention to address the above impairments. Continue treatment per established plan of care. Discharge Recommendations:  Chap[facility] Caal Further Equipment Recommendations for Discharge:  rolling walker SUBJECTIVE:  
 
 
OBJECTIVE DATA SUMMARY:  
Critical Behavior: 
Neurologic State: Alert Orientation Level: Oriented X4 Cognition: Follows commands Safety/Judgement: Fall prevention, Decreased insight into deficits Functional Mobility Training: 
Bed Mobility: 
  
  
Sit to Supine: Moderate assistance;Maximum assistance Transfers: 
Sit to Stand: Moderate assistance Stand to Sit: Moderate assistance;Assist x2 Mod of 2 bed to chair Balance: 
Sitting: High guard Standing: Impaired; With support Pain: 
Pain Scale 1: Numeric (0 - 10) Pain Intensity 1: 0 Activity Tolerance:  
Fair. Please refer to the flowsheet for vital signs taken during this treatment. After treatment:  
?    Patient left in no apparent distress sitting up in chair ? Patient left in no apparent distress in bed 
? Call bell left within reach ? Nursing notified ? Caregiver present ? Bed alarm activated COMMUNICATION/COLLABORATION:  
The patient?s plan of care was discussed with: Physical Therapist 
 
Sole Angel PTA Time Calculation: 23 mins

## 2019-04-25 NOTE — PROGRESS NOTES
[facility]: General acute hospital Neurology [facility] [phone] Name:   Jenn Garsia Medical record #: [id number] Admission Date: 4/22/2019 Reason for Consult:  AMS Subjective:  
Overnight events: 
 
Acute ischemic stroke visible on DWI Objective: EEG: 
 
 
Assessment/Plan: 1. Acute Ischemic Stroke: · Neurochecks:  Every 4 hours · Folate, B12, Ammonia all unremarkable · Follow up with Dr. May Hill or [facility] for further Parkinson's management Stroke work up · Last A1C: 5.5 · LDL:  73.8, atorvastatin started · Last TTE:  Estimated left ventricular ejection fraction is 56 - 60%. No regional wall motion abnormality noted. · Carotids:   Findings are consistent with 0-49% stenosis of the right internal carotid and >70% stenosis of the left internal carotid. Scattered heterogeneous plaque noted in the CCA bilaterally and calcific plaque noted in the bulb and ICA bilaterally. Asymmetric vertebrals noted with antegrade flow. Risk factors for stroke include: HTN, HLD, physical inactivity · Discussed with patient · Discussed signs/symptoms of stroke and when to call 911 2. Mobility:  
· Has been OOB. · PT/OT to eval for rehab 
  
4. Diet:   
· Does not need SLP, passed STAND  
  
5. VTE Prophylaxes: · Per primary team  
  Thank you for allowing the Neurology Service the pleasure of participating in the care of your patient. This patient will be discussed with my collaborating care team physician Dr. Adore Carvalho and he may have further recommendations regarding this patient's care.   
  
  
   
Attending Attestation:  
  
NEUROLOGY NOTE ADDENDUM: 
  
4/23/2019 
  
  
I have reviewed the documentation provided by the nurse practitioner, discussed her findings, clinical impression, and the proposed management plans with regards to this patient's encounter. I have personally performed a face to face diagnostic evaluation on this patient.  My findings are as follows: 
  
Yareli Gutierrez is a 80 y.o. male who  has a past medical history of Colon cancer ([facility] [address] Hypertension, Lung cancer ([address] Parkinson disease ([address] and Prostate cancer ([facility] [address] who presents for evaluation of AMS 
  
Mr. Ksenia Mayberry presented to the ED with chief complaint of altered mental status and hypotension. His wife reports that she found him drooling, with his head forward, eyes closed\" and \"not responsive\" to her shaking him or trying to open his eyes. Spouse reports that she called EMS. EMS personnel report that the spouse told them the patient \"isn't right\". They state that patient became hypotensive en route.  
  
Patient follows up at [facility] for parkinson's disease on Sinemet. 
  
(+) 1-2 weeks new onset L wrist drop 
  
Exam: 
 
Visit Vitals /75 (BP 1 Location: Left arm) Pulse (!) 58 Temp 97.3 °F (36.3 °C) Resp 16 Ht 5' 10\" (1.778 m) Wt 77.1 kg (170 lb) SpO2 97% BMI 24.39 kg/m²  
 
  
Gen: Awake, alert, follows commands Getting shaved Motor function: (+) L wrist drop Rest is 5/5 
(+) masked facies (+) rigidity 
(+) bradykinesia Sensory: intact to LT 
DTRs + in all extremities, (-) Babinski Good FTN and HTS Gait: Deferred 
  
  
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 
  
Plan 1. Continue ASA 81 every day 2. Needs EMG/NCS of the L UE to evaluate for L radial neuropathy which can be done as an out patient c/o his neurologist at [facility] 3. Physical therapy/ Occupational therapy 4. Needs to follow up with a neurointerventionalist regarding L ICA stenosis 5. Control cholesterol c/o diet (developed weakness with statin) 6. Discharge  Planning Will sign off 
  
 Thank you for the consultation. 
  
  
Becky Sebastian MD 
Diplomate, American Board of Psychiatry and Neurology Diplomate, Neuromuscular Medicine Diplomate, [address] [place] [address] Board of Electrodiagnostic Medicine 
  
   
 
 
 
 
Physical Exam: 
 
General:  Alert, cooperative, no acute distress. Lungs:   Clear to auscultation bilaterally. No crackles/wheezes. Heart: 
Abdominal:  Regular rate and rhythm, No murmur, click, rub or gallop. Soft and nondistended Skin: Skin color, texture, turgor normal.   
NEUROLOGICAL EXAM: 
 
General:  Alert, cooperative, no acute distress. Lungs:   Clear to auscultation bilaterally. No crackles/wheezes. Heart: 
Abdominal:  Regular rate and rhythm, No murmur, click, rub or gallop. Soft and nondistended Skin: Skin color, texture, turgor normal.   
  
NEUROLOGICAL EXAM: 
  
Appearance:  Well developed, well nourished,  and is in no acute distress. Mental Status: Oriented to time, place and person. Fully attentive. No aphasia. Full fund of knowledge. Normal recent and remote memory. Cranial Nerves:   Intact visual fields. PERRL, EOM's full, no nystagmus, no ptosis. Facial sensation is normal. Facial movement is symmetric. Palate is midline. Normal sternocleidomastoid strength. Tongue is midline. Reflexes:   Deep tendon reflexes +1 on left and normal on the right Sensory:   Normal to temperature and vibration. Gait:  Not tested. Tremor:   Left sided tremor, bradykinetic, Cerebellar:  No cerebellar signs present. Motor: No pronator drift of either outstretched arm. Left sided wrist droop 
  
  
 
 
 
Current Facility-Administered Medications Medication Dose Route Frequency Provider Last Rate Last Dose  aspirin chewable tablet 81 mg  81 mg Oral DAILY Paolo Binder A, NP   81 mg at 04/25/19 0841  
 0.9% sodium chloride infusion  75 mL/hr IntraVENous CONTINUOUS Brianna Mariscal MD 75 mL/hr at 19 0313 75 mL/hr at 19 6093  sodium chloride (NS) flush 5-40 mL  5-40 mL IntraVENous Q8H Brianna Mariscal MD   10 mL at 19 0521  
 sodium chloride (NS) flush 5-40 mL  5-40 mL IntraVENous PRN Brianna Mariscal MD      
 acetaminophen (TYLENOL) tablet 650 mg  650 mg Oral Q4H PRN Brianna Mariscal MD      
 prochlorperazine (COMPAZINE) injection 10 mg  10 mg IntraVENous Q6H PRN Brianna Mariscal MD   10 mg at 19 0246  enoxaparin (LOVENOX) injection 40 mg  40 mg SubCUTAneous Q24H Brianna Mariscal MD   40 mg at 19 2204  carbidopa-levodopa (SINEMET)  mg per tablet 1.5 Tab  1.5 Tab Oral QID Brianna aMriscal MD   1.5 Tab at 19 3503  carbidopa-levodopa ER (SINEMET CR)  mg per tablet 1 Tab  1 Tab Oral QHS Brianna Mariscal MD   1 Tab at 19  fludrocortisone (FLORINEF) tablet 100 mcg  0.1 mg Oral DAILY Brianna Mariscal MD   100 mcg at 19 0841  
 HYDROcodone-acetaminophen (NORCO)  mg tablet 1 Tab  1 Tab Oral Q6H PRN Brianna Mariscal MD   1 Tab at 19 No results found for this or any previous visit (from the past 24 hour(s)). Visit Vitals /86 (BP 1 Location: Right arm, BP Patient Position: At rest) Pulse 74 Temp 97.6 °F (36.4 °C) Resp 18 Ht 5' 10\" (1.778 m) Wt 77.1 kg (170 lb) SpO2 93% BMI 24.39 kg/m² O2 Device: Room air Temp (24hrs), Av.6 °F (36.4 °C), Min:97.3 °F (36.3 °C), Max:97.7 °F (36.5 °C) No intake/output data recorded.  1901 -  0700 In: 2130 [P.O.:220; I.V.:1910] Out: 2850 [Urine:2750] Care Plan discussed with: 
Patient x Family RN Care Manager Consultant/Specialist:    
 
 
BHUPINDER Tan-BC

## 2019-04-25 NOTE — PROGRESS NOTES
OCCUPATIONAL THERAPY TREATMENT Patient: Pablo Pardo (10 y.o. male) Date: 4/25/2019 Diagnosis: Loss of consciousness ([address] [R40.20] Loss of consciousness ([address] [R40.20] Loss of consciousness ([address] Precautions: Fall Chart, occupational therapy assessment, plan of care, and goals were reviewed. ASSESSMENT: 
Pt stated he has been out of bed today but willing to engage with L finger extension, L wrist flex/ext and elbow flex/ext. Pt able to use R hand to assist with L finger ext, needs assist for wrist ext and able to actively move his L elbow. Placed L UE on pillow for support/comfort. Recommend SNF. Progression toward goals: 
?       Improving appropriately and progressing toward goals ? Improving slowly and progressing toward goals ? Not making progress toward goals and plan of care will be adjusted PLAN: 
Patient continues to benefit from skilled intervention to address the above impairments. Continue treatment per established plan of care. Discharge Recommendations: SNF Further Equipment Recommendations for Discharge: None SUBJECTIVE:  
Patient stated ? My wife brought in these splints. ? OBJECTIVE DATA SUMMARY:  
Cognitive/Behavioral Status: 
Neurologic State: Alert Orientation Level: Oriented X4 Cognition: Appropriate decision making Therapeutic Exercises:  
Pt engaged with L finger extension, wrist flex/ext and elbow flex/ext. He as able to use R hand to extend fingers and wrist, actively moved L elbow on his own. Activity Tolerance:  
Fair Please refer to the flowsheet for vital signs taken during this treatment. After treatment:  
? Patient left in no apparent distress sitting up in chair ? Patient left in no apparent distress in bed 
? Call bell left within reach ? Nursing notified ? Caregiver present ? Bed alarm activated COMMUNICATION/COLLABORATION:  
The patient?s plan of care was discussed with: Physical Therapy Assistant, Occupational Therapist and Registered Nurse KRISTI Alejandro Time Calculation: 15 mins

## 2019-04-25 NOTE — PROGRESS NOTES
Roderick Luke [facility] 
[address] [facility], [address] [place] [address] 
[phone] Medical Progress Note NAME: Elaine Tellez :  1935 MRM:  [id number] Date/Time: 2019  11:28 AM  
 
 
  
Subjective: Chief Complaint:  LOC: no further episodes ROS: 
(bold if positive, if negative) Tolerating PT  Tolerating Diet Objective:  
 
 
Vitals:  
 
 
  
Last 24hrs VS reviewed since prior progress note. Most recent are: 
 
Visit Vitals /75 (BP 1 Location: Left arm) Pulse (!) 58 Temp 97.3 °F (36.3 °C) Resp 16 Ht 5' 10\" (1.778 m) Wt 77.1 kg (170 lb) SpO2 97% BMI 24.39 kg/m² SpO2 Readings from Last 6 Encounters:  
19 97% 19 99% 19 97% 18 95% 18 98% 18 95% Intake/Output Summary (Last 24 hours) at 2019 1128 Last data filed at 2019 2750 Gross per 24 hour Intake 220 ml Output 1850 ml Net -1630 ml Exam:  
 
Physical Exam: 
 
Gen:  Well-developed, well-nourished, frail, eldelry, chronically ill-appearing, in no acute distress HEENT:  Pink conjunctivae, PERRL, hearing intact to voice, moist mucous membranes Neck:  Supple, without masses, thyroid non-tender Resp:  No accessory muscle use, clear breath sounds without wheezes rales or rhonchi 
Card:  No murmurs, normal S1, S2 without thrills, bruits or peripheral edema Abd:  Soft, non-tender, non-distended, normoactive bowel sounds are present, no palpable organomegaly and no detectable hernias Lymph:  No cervical or inguinal adenopathy Musc:  No cyanosis or clubbing Skin:  No rashes or ulcers, skin turgor is good Neuro:  Cranial nerves are grossly intact, no focal motor weakness, follows commands appropriately Psych:  Poor insight, oriented to person, place but not time, alert Medications Reviewed: (see below) Lab Data Reviewed: (see below) ______________________________________________________________________ Medications:  
 
Current Facility-Administered Medications Medication Dose Route Frequency  atorvastatin (LIPITOR) tablet 40 mg  40 mg Oral QHS  carbidopa-levodopa (SINEMET)  mg per tablet 1.5 Tab  1.5 Tab Oral BID  [START ON 4/26/2019] carbidopa-levodopa (SINEMET)  mg per tablet 2 Tab  2 Tab Oral BID  aspirin chewable tablet 81 mg  81 mg Oral DAILY  0.9% sodium chloride infusion  75 mL/hr IntraVENous CONTINUOUS  
 sodium chloride (NS) flush 5-40 mL  5-40 mL IntraVENous Q8H  
 sodium chloride (NS) flush 5-40 mL  5-40 mL IntraVENous PRN  
 acetaminophen (TYLENOL) tablet 650 mg  650 mg Oral Q4H PRN  prochlorperazine (COMPAZINE) injection 10 mg  10 mg IntraVENous Q6H PRN  
 enoxaparin (LOVENOX) injection 40 mg  40 mg SubCUTAneous Q24H  carbidopa-levodopa ER (SINEMET CR)  mg per tablet 1 Tab  1 Tab Oral QHS  fludrocortisone (FLORINEF) tablet 100 mcg  0.1 mg Oral DAILY  HYDROcodone-acetaminophen (NORCO)  mg tablet 1 Tab  1 Tab Oral Q6H PRN Lab Review:  
 
Recent Labs  
  04/24/19 
0419 04/23/19 
0252 04/22/19 
1131 WBC 3.6* 5.2 9.4 HGB 8.9* 7.6* 10.0* HCT 29.3* 24.9* 33.1*  
 204 266 Recent Labs  
  04/24/19 
0419 04/23/19 
0252 04/22/19 
1131  138 135* K 3.5 3.2* 3.5  105 100 CO2 27 27 30 GLU 76 81 94 BUN 13 19 18 CREA 0.43* 0.48* 0.60* CA 8.3* 7.6* 8.6 MG  --  1.6  --   
PHOS  --  2.7  --   
ALB  --   --  3.1* TBILI  --   --  0.7 SGOT  --   --  6* ALT  --   --  <6* Lab Results Component Value Date/Time Glucose (POC) 82 04/23/2019 01:05 PM  
 
No results for input(s): PH, PCO2, PO2, HCO3, FIO2 in the last 72 hours. No results for input(s): INR in the last 72 hours. No lab exists for component: INREXT, INREXT No results found for: SDES Lab Results Component Value Date/Time Culture result: NO GROWTH 2 DAYS 04/22/2019 11:31 AM  
 
 
 
  
Assessment:  
 
Principal Problem: 
  Loss of consciousness ([address] (4/22/2019) Active Problems: 
  Parkinson disease ([address] (4/22/2019) HTN (hypertension) (4/22/2019) Abnormal urine sediment (4/22/2019) Debility (4/22/2019) Anemia (4/24/2019) Plan:  
 
Principal Problem: 
  Loss of consciousness ([address] (4/22/2019)/Subacute CVA 
 - suspect these are syncopal episodes due to dysautonomia related to Parkinsons (patient was already on Florinef) - MRI did show subacute CVA per Dr. Trav Lees; continue ASA and given ipsilateral >70% carotid disease, outpatient follow up with Neurointerventional Radiology per Neurology - I think this may be more related to transient hypotension with dysautonomia Active Problems: 
  Parkinson disease ([address] (4/22/2019) - progressive, continue meds 
 - wife does not fully understand the progressive nature of her 's disease 
 - she is not able to care for him adequately - I suspect she has significant dementia of her own; she does not remember anything from our interaction in the ED even after being reminded of specific details - wife and patient did both decide to be DNR yesterday HTN (hypertension) (4/22/2019) 
 - off meds, would allow supine HTN and would not treat unless has symptomatic HTN Abnormal urine sediment (4/22/2019) 
 - urine culture negative, likely due to dehydration Debility (4/22/2019) 
 - PT/OT, may progress some but I would say that he needs LTC at this point, as I suspect his wife does, too 
 - they could probably get along in an [facility] but I do not know if they have the resources for that, CM aware Anemia (4/24/2019) - Hgb up to 8.9 without transfusion, suspect preious value was spurious Total time spent in patient care: 25 minutes Care Plan discussed with: Patient, Family, Care Manager and Nursing Staff Discussed:  Code Status, Care Plan and D/C Planning Prophylaxis:  Lovenox Disposition:  SNF/LTC 
        
___________________________________________________ Attending Physician: Rhoda Jolly MD

## 2019-04-25 NOTE — PROGRESS NOTES
4/25/2019   CARE MANAGEMENT NOTE: 
 
Transition of Care Plan: 1. Plan is for SNF level of rehab. Referrals were sent to  ([id number] [facility], [facility], and [facility]) for review and to explore bed availability. 2.  Pt's status changed from OBS to inpt on 4/23 so a three night qualifying stay is needed (Friday will be the earliest discharge date to SNF). 3.  CM will provide community resources for additional private duty agencies should wife want to increase caregiver hours, and a list of Assisted Living facilities for future reference. 4.  CM left vm message with [facility]  to update agency on plan for SNF as they had been providing home PT/OT/ST. 
  
CM will continue to follow pt for discharge planning. Praveena

## 2019-04-25 NOTE — PROGRESS NOTES
Problem: Mobility Impaired (Adult and Pediatric) Goal: *Acute Goals and Plan of Care (Insert Text) Description Physical Therapy Goals Initiated 4/23/2019 1. Patient will move from supine to sit and sit to supine  in bed with minimal assistance/contact guard assist within 7 day(s). 2.  Patient will transfer from bed to chair and chair to bed with minimal assistance/contact guard assist using the least restrictive device within 7 day(s). 3.  Patient will perform sit to stand with minimal assistance/contact guard assist within 7 day(s). 4.  Patient will ambulate with minimal assistance/contact guard assist for 100 feet with the least restrictive device within 7 day(s). Note: PHYSICAL THERAPY TREATMENT Patient: Rik Shine (57 y.o. male) Date: 4/25/2019 Diagnosis: Loss of consciousness ([address] [R40.20] Loss of consciousness ([address] [R40.20] Loss of consciousness ([address] Precautions: Fall Chart, physical therapy assessment, plan of care and goals were reviewed. ASSESSMENT: 
Pt had just finished repositioning in bed for comfort by lift team and nursing. Pt declined mobilizing out of bed. Pt agreed to perform LE exercise. Pt performed heel slides ankle pumps and hip abd/add with min assist and cues. Pt tolerated treatment well. PT will follow in AM. Progression toward goals: 
?    Improving appropriately and progressing toward goals ? Improving slowly and progressing toward goals ? Not making progress toward goals and plan of care will be adjusted PLAN: 
Patient continues to benefit from skilled intervention to address the above impairments. Continue treatment per established plan of care. Discharge Recommendations:  [facility] Further Equipment Recommendations for Discharge: SUBJECTIVE:  
 
 
OBJECTIVE DATA SUMMARY:  
Critical Behavior: 
Neurologic State: Alert Orientation Level: Oriented X4 Cognition: Appropriate decision making Safety/Judgement: Lack of insight into deficits Therapeutic Exercises:  
 
Heel slides ankle pumps and hip abd/add to LEs Activity Tolerance:  
Pt tolerated treatment well. Please refer to the flowsheet for vital signs taken during this treatment. After treatment:  
?    Patient left in no apparent distress sitting up in chair ? Patient left in no apparent distress in bed 
? Call bell left within reach ? Nursing notified ? Caregiver present ? Bed alarm activated COMMUNICATION/COLLABORATION:  
The patient?s plan of care was discussed with: Physical Therapist 
 
Mallory Aburto PTA Time Calculation: 8 mins

## 2019-04-25 NOTE — PROGRESS NOTES
Bedside shift change report given to Carolyn Davis (oncoming nurse) by Richie Herrmann (offgoing nurse). Report included the following information SBAR and Kardex.

## 2019-04-26 NOTE — DISCHARGE SUMMARY
Physician Discharge Summary Patient ID: Grant Mcdaniel [id number] 
87 y.o. 
1935 Admit date: 4/22/2019 Discharge date: 4/26/2019 Admission Diagnoses: Loss of consciousness ([address] [R40.20] Loss of consciousness ([address] [R40.20] Discharge Diagnoses:  Principal Diagnosis Loss of consciousness ([address] Principal Problem: 
  Loss of consciousness ([address] (4/22/2019) Active Problems: 
  Parkinson disease ([address] (4/22/2019) HTN (hypertension) (4/22/2019) Abnormal urine sediment (4/22/2019) Debility (4/22/2019) Anemia (4/24/2019) Resolved Problems: 
Problem List as of 4/26/2019 Date Reviewed: 1/14/2019 Codes Class Noted - Resolved Anemia (Chronic) ICD-10-CM: D64.9 ICD-9-CM: 285.9  4/24/2019 - Present * (Principal) Loss of consciousness ([address] ICD-10-CM: R40.20 ICD-9-CM: 780.09  4/22/2019 - Present Parkinson disease ([address] (Chronic) ICD-10-CM: G20 
ICD-9-CM: 332.0  4/22/2019 - Present HTN (hypertension) (Chronic) ICD-10-CM: I10 
ICD-9-CM: 401.9  4/22/2019 - Present Abnormal urine sediment ICD-10-CM: R82.90 ICD-9-CM: 791.9  4/22/2019 - Present Debility ICD-10-CM: R53.81 ICD-9-CM: 799.3  4/22/2019 - Present Hospital Course: Mr. Franc Santa was admitted to the Hospitalist Service on the 5th floor for treatment of periods of loss of consciousness. These episodes were felt to be likely syncope, less likely seizures. He was evaluated by Neurology and EEG was unremarkable. Neurology agreed that these episodes were likely due to autonomic dysfunction related to his Parkinsons disease; he had known issues PTA and was on Florinef. Orthostatics were ordered but were never done as he was too frail to cooperate with them. He was mobilized with PT/OT and recommended for SNF placement for rehab. He and his wife agreed.   I am very concerned that his wife is no longer able to adequately care for him as his Parkinsons advances. Additionally, I saw evidence of dementia in her and I suspect she will likely not be able to care for herself some time in the future, much less care for him. Ultiimately, it would be to both their benefits to move into an assisted living facility at the end of his rehab stay, however, they were not receptive to this idea. No other family was involved during his stay here. He and his wife did elect to be DO NOT RESUSCITATE and he filled out EMS-DDNR paperwork while here. He was discharged [facility] Guz[facility] on 4/26/2019 in improved condition. PCP: Fermín Rasmussen MD 
 
Consults: Neurology and Palliative Care Discharge Exam: 
See my Progress Note from today. Disposition: SNF Patient Instructions:  
Current Discharge Medication List  
  
START taking these medications Details  
aspirin 81 mg chewable tablet Take 1 Tab by mouth daily. atorvastatin (LIPITOR) 40 mg tablet Take 1 Tab by mouth nightly. CONTINUE these medications which have CHANGED Details  
carbidopa-levodopa (SINEMET)  mg per tablet Take 1.5 Tabs by mouth four (4) times daily for 6 days. Per instructions from Dr. Kika Acosta on 4/10/19 Week 3 (4/26-5/2): Take 2 tablets at 0600/1000 & 1.5 tablets at 1400/1800 Week 4 (5/3-5/9): Take 2 tablets at 0600/1000/1400/1800 May stop increasing if good motor symptom control or any issues Qty: 36 Tab, Refills: 0 HYDROcodone-acetaminophen (NORCO)  mg tablet Take 1 Tab by mouth two (2) times daily as needed for Pain for up to 7 days. Max Daily Amount: 2 Tabs. Qty: 14 Tab, Refills: 0 Associated Diagnoses: Chronic pain syndrome CONTINUE these medications which have NOT CHANGED Details  
carbidopa-levodopa ER (SINEMET CR)  mg per tablet Take 1 Tab by mouth nightly. fludrocortisone (FLORINEF) 0.1 mg tablet Take 0.1 mg by mouth daily. Activity: Activity as tolerated with assistance Diet: Cardiac Diet Wound Care: None needed Follow-up Information Follow up With Specialties Details Why Contact Info Donald Dupree MD Neurology In 4 weeks [facility] Follow up [address] [facility]  Donita East MD General Practice In 2 weeks  [address] [facility] 97 [address] 
[phone] [address] [place] [address] [address] 
[phone]  
  
 
 
35 minutes were spent on this discharge.  
 
Signed: 
Coleman Levi MD 
4/26/2019 
11:37 AM

## 2019-04-26 NOTE — WOUND CARE
Wound/Ostomy follow up: 
 
Patient is scheduled for discharge to [address] [place] [address] today. Extra Ostomy supplies provided to patient for discharge planning. Patient is up in a wheelchair with the student nurse at bedside managing a bleeding skin tear to the left arm. Applied quickclot to the open tear to help slow bleeding, and once stopped a second strip of quickclot was applied to the site. Covered with gauze and secured with kerlex. Consult as needed,  
Leyda Pavon BSN RN [facility]

## 2019-04-26 NOTE — PROGRESS NOTES
Problem: Pressure Injury - Risk of 
Goal: *Prevention of pressure injury Outcome: Progressing Towards Goal 
  
Problem: Patient Education: Go to Patient Education Activity Goal: Patient/Family Education Outcome: Progressing Towards Goal 
  
Problem: Falls - Risk of 
Goal: *Absence of Falls Outcome: Progressing Towards Goal 
  
Problem: Patient Education: Go to Patient Education Activity Goal: Patient/Family Education Outcome: Progressing Towards Goal

## 2019-04-26 NOTE — PROGRESS NOTES
Bedside and Verbal shift change report given to Antonio Landers RN (oncoming nurse) by Kirsty Maher (offgoing nurse). Report included the following information SBAR and Kardex.

## 2019-04-26 NOTE — PROGRESS NOTES
Problem: Dysphagia (Adult) Goal: *Acute Goals and Plan of Care (Insert Text) Description Swallowing goals initiated 4-24-19:  
1) tolerate regular, thins without s/s aspiration by 5-1-19 Outcome: Progressing Towards Goal 
 SPEECH LANGUAGE PATHOLOGY DYSPHAGIA TREATMENT Patient: Izzy Lewis (81 y.o. male) Date: 4/26/2019 Diagnosis: Loss of consciousness ([address] [R40.20] Loss of consciousness ([address] [R40.20] Loss of consciousness ([address] Precautions: aspiration Fall ASSESSMENT: 
Patient with mild-moderate dysarthria (suspected premorbid from PD) and mild oral-pharyngeal dysphagia (again suspected to be premorbid). He is tolerating diet. Progression toward goals: 
?         Improving appropriately and progressing toward goals ? Improving slowly and progressing toward goals ? Not making progress toward goals and plan of care will be adjusted PLAN: 
Recommendations and Planned Interventions: 
Continue [facility] soft, thins SNF for work on dysarthria, breath support. Patient continues to benefit from skilled intervention to address the above impairments. Continue treatment per established plan of care. Discharge Recommendations:  [facility] SUBJECTIVE:  
Patient stated ? DEEPTI. They don't even know if I'm leaving today or not. ?. 
 
OBJECTIVE:  
Cognitive and Communication Status: 
Neurologic State: Alert Orientation Level: Oriented to place, Oriented to time, Oriented to situation Cognition: Appropriate decision making Perception: Appears intact Perseveration: No perseveration noted Safety/Judgement: Lack of insight into deficits Dysphagia Treatment: 
Oral Assessment: P.O. Trials: 
  
Vocal quality prior to P.O.:  occasional throat clear after bfast, may indicated mild pharyngeal residue or trace penetration/aspiration. Voice was moderately strong x for intermittent beginnings of sentences. Mildly hoarse. We discussed dysarthria with PD with pressured, rushed speech, low volume, consonsant spirantization. Strategies of increased breath support and possible slowed rate discussed. Patient seen during bfast: ORAL PHASE:  
Feeder status is an aspiration risk. Small bites. , 
Reduced chew PHARYNGEAL PHASE:  
Mildly weak Timing WFL> No overt s/s aspiration. See voice above. Exercises: 
Laryngeal Exercises: 
  
  
  
  
  
  
  
  
  
  
  
  
  
  
  
  
  
  
  
  
  
  
  
  
  
  
  
  
  
  
  
  
  
  
  
  
  
  
  
  
  
  
  
Pain: 
Pain Scale 1: Numeric (0 - 10) Pain Intensity 1: 0 After treatment:  
?              Patient left in no apparent distress sitting up in chair ? Patient left in no apparent distress in bed 
? Call bell left within reach ? Nursing notified ? Caregiver present ? Bed alarm activated COMMUNICATION/EDUCATION:  
Patient was educated regarding His deficit(s) of dysphagia , dysarthria as this relates to His diagnosis of PD. He demonstrated Good understanding as evidenced by discussion. Alejandro Recinos The patient?s plan of care including recommendations, planned interventions, and recommended diet changes were discussed with: Registered Nurse. ? Posted safety precautions in patient's room. Regan Payne, SLP Time Calculation: 20 mins

## 2019-04-26 NOTE — PROCEDURES
Name:   Evgeny Banuelos  YOB: 1935  MRN:   [id number]           Procedure:   EEG     Location:   [place] [address]  Date of Recordin19    Date of Interpretation:    19    Interpreting physician: Haydee Christine MD  Requesting provider:   Concetta Lyons NP      Indication: 80 y.o. male with complaints of altered mental status    Current medications: has a current medication list which includes the following prescription(s): carbidopa-levodopa, hydrocodone-acetaminophen, aspirin, atorvastatin, carbidopa-levodopa er, and fludrocortisone. Technical:   Digital EEG recorded in 10-20 international placement system, multiple montages    Interpretation:   Patient level of alertness: awake, answering questions. Background pattern: symmetric, moderate, diffuse myogenic artifact. Posterior dominant rhythm: 5-6 Hz. Photic stimulation: mild symmetric driving response. Hyperventilation: not performed due to patient factors. Drowsiness recorded: no.  Sleep recorded: no.  Single lead EKG: mild irregularity. Areas of focal slowing: none. Epileptiform discharges: none. Electrographic seizures: none. Impression: This is an abnormal awake EEG recording with mild generalized slowing suggestive of a mild encephalopathic process, not specific as to cause. There were no seizure discharges or subclinical seizures. Clinical correlation is necessary. The single lead EKG showed irregular rhythm. Recommend correlation with 12 leak EKG or other cardiac testing.      Haydee Christine MD  [facility]

## 2019-04-26 NOTE — PALLIATIVE CARE DISCHARGE
Goals of Care/Treatment Preferences The Palliative Medicine team was consulted as part of your/your loved one's care in the hospital. Our team is a supportive service; we strive to relieve suffering and improve quality of life. We reviewed advance care planning information, which includes the following: 
Patient's [place] is[de-identified] Verbal statement (Legal Next of Kin remains as decision maker) Confirm Advance Directive: Yes, not on file Does the patient have other document types: Do Not Resuscitate Patient/Health Care Proxy Stated Goals: Rehabilitation(Improve function, get stronger) We reviewed / discussed your code status as: DNR 
   Full Code means perform CPR in the event of cardiac arrest. 
    DNR means do NOT perform CPR in the event of cardiac arrest. 
    Partial Code means you have specific preferences, please discuss with your healthcare team. 
    Odilon Isabelle means this issue was not addressed / resolved during your stay Medical Interventions: Limited additional interventions Other Instructions: You have a Durable Do Not Resuscitate Order in place, which should travel with you. When you are in a facility, this form should be placed on your chart. Once you are home, it is recommended that the [facility] form be placed in a visible location such as on the refrigerator or bedroom door. Because of the importance of this information, we are providing you with a printed copy to share with other healthcare providers after this hospitalization is complete.

## 2019-04-26 NOTE — DISCHARGE INSTRUCTIONS
HOSPITALIST DISCHARGE INSTRUCTIONS  NAME: Kenya Dobson   :  1935   MRN:  [id number]     Date/Time:  2019 10:32 AM    ADMIT DATE: 2019     DISCHARGE DATE: 2019     DISCHARGE DIAGNOSIS:  Syncope - fainting spell    MEDICATIONS:  · It is important that you take the medication exactly as they are prescribed. · Keep your medication in the bottles provided by the pharmacist and keep a list of the medication names, dosages, and times to be taken in your wallet. · Do not take other medications without consulting your doctor. Pain Management: per above medications    What to do at Home    Recommended diet:  Cardiac Diet    Recommended activity: Activity as tolerated with assistance    If you experience any of the following symptoms then please call your primary care physician or return to the emergency room if you cannot get hold of your doctor:  Fever, chills, nausea, vomiting, diarrhea, change in mentation, falling, bleeding, shortness of breath    Follow Up: Follow-up Information     Follow up With Specialties Details Why Yuliana Petit MD Neurology In 4 weeks [facility] Follow up [address]      Janneth Kathleen MD General Practice In 2 weeks  [address] [place] [address]  [phone]              Information obtained by :  I understand that if any problems occur once I am at home I am to contact my physician. I understand and acknowledge receipt of the instructions indicated above.                                                                                                                                            Physician's or R.N.'s Signature                                                                  Date/Time                                                                                                                                              Patient or Representative Signature                                                          Date/Time

## 2019-04-26 NOTE — PROGRESS NOTES
4/26/2019   CARE MANAGEMENT NOTE:   
 
Transition Plan of Care: 1. Skilled bed is available at [facility] today for short term rehab. Pt agreed and choice letter was signed to verify (wife had given verbal consent earlier in the week). 2.  Radha hCaparro has asked for ostomy supplies to be sent with pt from the hospital as their supplier is unavailable until Monday. RN has gathered supplies. 3.  CM faxed AVS to SNF along with today's MAR, CXR, DMAS 95, and DNR. A hard copy of above will accompany pt to SNF. 4.  [facility] [employer] transport has been arranged to SNF at 12:30 p.m today. Pt and wife are aware and agreeable to discharge plan. 5.  Second Medicare letter was signed and placed into pt's chart. RN, please call report to Radha Chaparro at [phone]. Praveena

## 2019-04-26 NOTE — PROGRESS NOTES
Problem: Pressure Injury - Risk of 
Goal: *Prevention of pressure injury 4/26/2019 0031 by Ted Garcia RN Outcome: Progressing Towards Goal 
4/26/2019 0030 by Ted Garcia RN Outcome: Progressing Towards Goal 
  
Problem: Patient Education: Go to Patient Education Activity Goal: Patient/Family Education 4/26/2019 0031 by Ted Garcia RN Outcome: Progressing Towards Goal 
4/26/2019 0030 by Ted Garcia RN Outcome: Progressing Towards Goal 
  
Problem: Falls - Risk of 
Goal: *Absence of Falls 4/26/2019 0031 by Ted Garcia RN Outcome: Progressing Towards Goal 
4/26/2019 0030 by Ted Garcia RN Outcome: Progressing Towards Goal 
  
Problem: Patient Education: Go to Patient Education Activity Goal: Patient/Family Education 4/26/2019 0031 by Ted Garcia RN Outcome: Progressing Towards Goal 
4/26/2019 0030 by Ted Garcia RN Outcome: Progressing Towards Goal

## 2019-04-26 NOTE — PROGRESS NOTES
Roderick [address] 
[address] [facility], [address] [place] [address] 
[phone] Medical Progress Note NAME: Pilar Ortez :  1935 MRM:  [id number] Date/Time: 2019  10:33 AM  
 
 
  
Subjective: Chief Complaint:  LOC: no further episodes ROS: 
(bold if positive, if negative) Tolerating PT  Tolerating Diet Objective:  
 
 
Vitals:  
 
 
  
Last 24hrs VS reviewed since prior progress note. Most recent are: 
 
Visit Vitals /74 (BP 1 Location: Right arm, BP Patient Position: Sitting) Pulse 63 Temp 98.3 °F (36.8 °C) Resp 16 Ht 5' 10\" (1.778 m) Wt 77.1 kg (170 lb) SpO2 97% BMI 24.39 kg/m² SpO2 Readings from Last 6 Encounters:  
19 97% 19 99% 19 97% 18 95% 18 98% 18 95% Intake/Output Summary (Last 24 hours) at 2019 1033 Last data filed at 2019 3712 Gross per 24 hour Intake 4642.5 ml Output 1925 ml Net 2717.5 ml Exam:  
 
Physical Exam: 
 
Gen:  Well-developed, well-nourished, frail, eldelry, chronically ill-appearing, in no acute distress HEENT:  Pink conjunctivae, PERRL, hearing intact to voice, moist mucous membranes Neck:  Supple, without masses, thyroid non-tender Resp:  No accessory muscle use, clear breath sounds without wheezes rales or rhonchi 
Card:  No murmurs, normal S1, S2 without thrills, bruits or peripheral edema Abd:  Soft, non-tender, non-distended, normoactive bowel sounds are present, no palpable organomegaly and no detectable hernias Lymph:  No cervical or inguinal adenopathy Musc:  No cyanosis or clubbing Skin:  No rashes or ulcers, skin turgor is good Neuro:  Cranial nerves are grossly intact, no focal motor weakness, follows commands appropriately Psych:  Poor insight, oriented to person, place but not time, alert Medications Reviewed: (see below) Lab Data Reviewed: (see below) ______________________________________________________________________ Medications:  
 
Current Facility-Administered Medications Medication Dose Route Frequency  atorvastatin (LIPITOR) tablet 40 mg  40 mg Oral QHS  carbidopa-levodopa (SINEMET)  mg per tablet 1.5 Tab  1.5 Tab Oral BID  carbidopa-levodopa (SINEMET)  mg per tablet 2 Tab  2 Tab Oral BID  aspirin chewable tablet 81 mg  81 mg Oral DAILY  0.9% sodium chloride infusion  75 mL/hr IntraVENous CONTINUOUS  
 sodium chloride (NS) flush 5-40 mL  5-40 mL IntraVENous Q8H  
 sodium chloride (NS) flush 5-40 mL  5-40 mL IntraVENous PRN  
 acetaminophen (TYLENOL) tablet 650 mg  650 mg Oral Q4H PRN  prochlorperazine (COMPAZINE) injection 10 mg  10 mg IntraVENous Q6H PRN  
 enoxaparin (LOVENOX) injection 40 mg  40 mg SubCUTAneous Q24H  carbidopa-levodopa ER (SINEMET CR)  mg per tablet 1 Tab  1 Tab Oral QHS  fludrocortisone (FLORINEF) tablet 100 mcg  0.1 mg Oral DAILY  HYDROcodone-acetaminophen (NORCO)  mg tablet 1 Tab  1 Tab Oral Q6H PRN Lab Review:  
 
Recent Labs  
  04/24/19 
0419 WBC 3.6* HGB 8.9* HCT 29.3*  
 Recent Labs  
  04/24/19 
0419   
K 3.5  CO2 27 GLU 76 BUN 13  
CREA 0.43* CA 8.3* Lab Results Component Value Date/Time Glucose (POC) 82 04/23/2019 01:05 PM  
 
No results for input(s): PH, PCO2, PO2, HCO3, FIO2 in the last 72 hours. No results for input(s): INR in the last 72 hours. No lab exists for component: INREXT, INREXT No results found for: SDES Lab Results Component Value Date/Time Culture result: NO GROWTH 2 DAYS 04/22/2019 11:31 AM  
 
 
 
  
Assessment:  
 
Principal Problem: 
  Loss of consciousness ([address] (4/22/2019) Active Problems: 
  Parkinson disease ([facility] (4/22/2019) HTN (hypertension) (4/22/2019) Abnormal urine sediment (4/22/2019) Debility (4/22/2019) Anemia (4/24/2019) Plan: Principal Problem: 
  Loss of consciousness ([facility] (4/22/2019)/Subacute CVA 
 - suspect these are syncopal episodes due to dysautonomia related to Parkinsons (patient was already on Florinef) - MRI did show subacute CVA per Dr. Trish Martinez; continue ASA and given ipsilateral >70% carotid disease, outpatient follow up with Neurointerventional Radiology per Neurology - I think this may be more related to transient hypotension with dysautonomia but we were never able to do orthostatics - resume Florinef Active Problems: 
  Parkinson disease ([facility] (4/22/2019) - progressive, continue meds 
 - wife does not fully understand the progressive nature of her 's disease 
 - she is not able to care for him adequately - I suspect she has significant dementia of her own; she does not remember anything from our interaction in the ED even after being reminded of specific details - wife and patient did both decide to be DNR  
 
  HTN (hypertension) (4/22/2019) 
 - off meds, would allow supine HTN and would not treat unless has symptomatic HTN Abnormal urine sediment (4/22/2019) 
 - urine culture negative, likely due to dehydration Debility (4/22/2019) 
 - PT/OT, may progress some but I would say that he needs LTC at this point, as I suspect his wife does, too 
 - they could probably get along in an half-way but I do not know if they have the resources for that, CM aware Anemia (4/24/2019) - Hgb up to 8.9 without transfusion, suspect preious value was spurious Total time spent in patient care: 35 minutes Care Plan discussed with: Patient, Family, Care Manager and Nursing Staff Discussed:  Code Status, Care Plan and D/C Planning Prophylaxis:  Lovenox Disposition:  SNF/LTC 
        
___________________________________________________ Attending Physician: Abhijit Campbell MD

## 2019-04-26 NOTE — PROGRESS NOTES
TRANSFER - OUT REPORT: 
 
Verbal report given to Nato RN(name) on Theresa Negrete  being transferred to [facility] for routine progression of care Report consisted of patients Situation, Background, Assessment and  
Recommendations(SBAR). Information from the following report(s) SBAR, Kardex, MAR, Accordion and Recent Results was reviewed with the receiving nurse. Lines: Baez, ileostomy Opportunity for questions and clarification was provided. Patient transported with: 
 [facility]

## 2019-04-26 NOTE — PROGRESS NOTES
Bedside and Verbal shift change report given to jair limon  (oncoming nurse) by Milton Carrillo  (offgoing nurse). Report included the following information SBAR and Kardex.

## 2019-04-29 NOTE — ED TRIAGE NOTES
AMS since last night at [address]; recent d/c from [facility] going to [facility] last night. Reported very agitated last night. Question of parkinson meds? Wife on way. [address]. Bandages on left and right forearms.

## 2019-04-29 NOTE — PROGRESS NOTES
4/29/2019 
3:16 PM 
Date of previous inpatient admission/ ED visit? 4/22-4/26/19  Admission Loss of Consciousness What brought the patient back to ED? Agitation, UTI Did patient decline recommended services during last admission/ ED visit (if yes, what)? No 
 
Has patient seen a provider since their last inpatient admission/ED visit (if yes, when)? No, pt discharged to [address] [place] [address] for short term rehab 
 
CM Interventions: 
From previous inpatient admission/ED visit:  CM provided wife w/ resources for pvt duty [address] [facility], referral to [facility], pt was discharged to [facility] for short term rehab. From current inpatient admission/ED visit: [place] discussed pt w/ ED physician, pt has UTI and can return to SNF for rehab, however pt's wife does not want pt to return to [address] [place] [address] as she feels he was not getting good care. CM discussed w/ pt's wife, she would like referral to [facility] [address] [facility], choice letter signed, referral faxed to [phone];  CM spoke w/ Radha in admissions, they can accept today and will move pt to pvt room as soon as available. Nursing pt will admit to [place] [address], please call report to [phone]. CM notified pt's wife and MD. Garrett Anderson

## 2019-04-29 NOTE — DISCHARGE INSTRUCTIONS
Patient Education        Urinary Tract Infections in Men: Care Instructions  Your Care Instructions    A urinary tract infection, or UTI, is a general term for an infection anywhere between the kidneys and the tip of the penis. UTIs can also be a result of a prostate problem. Most cause pain or burning when you urinate. Most UTIs are caused by bacteria and can be cured with antibiotics. It is important to complete your treatment so that the infection does not get worse. Follow-up care is a key part of your treatment and safety. Be sure to make and go to all appointments, and call your doctor if you are having problems. It's also a good idea to know your test results and keep a list of the medicines you take. How can you care for yourself at home? · Take your antibiotics as prescribed. Do not stop taking them just because you feel better. You need to take the full course of antibiotics. · Take your medicines exactly as prescribed. Your doctor may have prescribed a medicine, such as phenazopyridine (Pyridium), to help relieve pain when you urinate. This turns your urine orange. You may stop taking it when your symptoms get better. But be sure to take all of your antibiotics, which treat the infection. · Drink extra water for the next day or two. This will help make the urine less concentrated and help wash out the bacteria causing the infection. (If you have kidney, heart, or liver disease and have to limit your fluids, talk with your doctor before you increase your fluid intake.)  · Avoid drinks that are carbonated or have caffeine. They can irritate the bladder. · Urinate often. Try to empty your bladder each time. · To relieve pain, take a hot bath or lay a heating pad (set on low) over your lower belly or genital area. Never go to sleep with a heating pad in place. To help prevent UTIs  · Drink plenty of fluids, enough so that your urine is light yellow or clear like water.  If you have kidney, heart, or liver disease and have to limit fluids, talk with your doctor before you increase the amount of fluids you drink. · Urinate when you have the urge. Do not hold your urine for a long time. Urinate before you go to sleep. · Keep your penis clean. Catheter care  If you have a drainage tube (catheter) in place, the following steps will help you care for it. · Always wash your hands before and after touching your catheter. · Check the area around the urethra for inflammation or signs of infection. Signs of infection include irritated, swollen, red, or tender skin, or pus around the catheter. · Clean the area around the catheter with soap and water two times a day. Dry with a clean towel afterward. · Do not apply powder or lotion to the skin around the catheter. To empty the urine collection bag  · Wash your hands with soap and water. · Without touching the drain spout, remove the spout from its sleeve at the bottom of the collection bag. Open the valve on the spout. · Let the urine flow out of the bag and into the toilet or a container. Do not let the tubing or drain spout touch anything. · After you empty the bag, clean the end of the drain spout with tissue and water. Close the valve and put the drain spout back into its sleeve at the bottom of the collection bag. · Wash your hands with soap and water. When should you call for help? Call your doctor now or seek immediate medical care if:    · Symptoms such as a fever, chills, nausea, or vomiting get worse or happen for the first time.     · You have new pain in your back just below your rib cage. This is called flank pain.     · There is new blood or pus in your urine.     · You are not able to take or keep down your antibiotics.    Watch closely for changes in your health, and be sure to contact your doctor if:    · You are not getting better after taking an antibiotic for 2 days.     · Your symptoms go away but then come back.    Where can you learn more?  Go to http://dee-rogelio.info/. Enter D727 in the search box to learn more about \"Urinary Tract Infections in Men: Care Instructions. \"  Current as of: March 20, 2018  Content Version: 11.9  © 5323-8911 [employer]. Care instructions adapted under license by [employer] (which disclaims liability or warranty for this information). If you have questions about a medical condition or this instruction, always ask your healthcare professional. [place] [address] any warranty or liability for your use of this information.

## 2019-04-29 NOTE — PROGRESS NOTES
4/29/2019 
11:46 AM 
Pt to [facility]  ED from [facility], per EMR pt discharged to that facility for short term rehab 4/26/19, today has agitation. Will follow. Gracie Diana

## 2019-04-29 NOTE — PROGRESS NOTES
Spiritual Care Assessment/Progress Note [address] 
 
 
NAME: Jacqueline Gillespie      MRN: [id number] AGE: 80 y.o. SEX: male Islam Affiliation: No preference Language: [place] 4/29/2019     Total Time (in minutes): 10 Spiritual Assessment begun in [facility] EMERGENCY DEPT through conversation with: 
  
    [x]Patient        [x] Family    [] Friend(s) Reason for Consult: Request by family/friend(s) Spiritual beliefs Per wife: (Please include comment if needed) [x] Identifies with a timothy tradition: Mu-ism     
   [] Supported by a timothy community:        
   [] Claims no spiritual orientation:       
   [] Seeking spiritual identity:            
   [] Adheres to an individual form of spirituality:       
   [] Not able to assess:                   
 
    
Identified resources for coping:  
   [] Prayer                           
   [] Music                  [] Guided Imagery 
   [] Family/friends                 [] Pet visits [] Devotional reading                         [] Unknown 
   [] Other:                                         
 
 
Interventions offered during this visit: (See comments for more details) Patient Interventions: Affirmation of emotions/emotional suffering Family/Friend(s): Catharsis/review of pertinent events in supportive environment, Jainism/blessing Plan of Care: 
 
 [] Support spiritual and/or cultural needs  
 [] Support AMD and/or advance care planning process    
 [] Support grieving process 
 [] Coordinate Rites and/or Rituals  
 [] Coordination with community clergy [] No spiritual needs identified at this time 
 [] Detailed Plan of Care below (See Comments)  [] Make referral to Music Therapy 
[] Make referral to Pet Therapy    
[] Make referral to Addiction services 
[] Make referral to [facility] 
[] Make referral to Spiritual Care Partner 
[] No future visits requested       
[x] Follow up visits as needed Comments: Mr. Miriam Crespo wife recognized me while I was in the ER. She asked for assistance with the bell going off in her 's room. Consulted with the unit secretary to let her RN know. Provided supportive presence. Mr. Charlotte Lee spoke quite a bit but was very difficult to understand. Asking yes and no questions seem to help as he is clear with yes and no. His wife is a very strong advocate for her , only wanting the best possible for them both. Left them both as the Doctor came in to speak with them. Provided assurace of prayers. Visited by: Parvez Hanley., MS., [address] ([id number])

## 2019-04-29 NOTE — ED PROVIDER NOTES
80 y.o. male with past medical history significant for Parkinson's disease, prostate CA, colon CA, lung CA and HTN who presents from [address] [place] [address] via EMS with chief complaint of AMS. Pt is a DNR and a poor historian due to dementia. History was provided by EMS. Pt was admitted to the ED on April 22nd for AMS and hypotension and had an unremarkable EEG. Pt was discharged to [address] [place] [address] for recovery 3 days ago. Per EMS, patient was AMS en scene and they were not able to obtain baseline mental status or LKW. EMS states that the pt was agitated last night and notes they found blood on the floor next to his bed. They do not know whether patient had any recent falls. EMS reports they saw a little bit of facial droop en scene but reports that it resolved on arrival to the ED. They note pt's BS was 85 en scene. There are no other acute medical concerns at this time. Full history, physical exam, and ROS unable to be obtained due to:  Dementia. Social hx: Former Smoker(Quit 2010); Occasional use of EtOH 
PCP: Vicky Vasquez MD 
 
Note written by Maria Victoria Rice, as dictated by Kyle Jefferson MD 11:36 AM 
 
 
The history is provided by medical records and the EMS personnel. No  was used. Past Medical History:  
Diagnosis Date  'light-for-dates' infant with signs of fetal malnutrition  Anemia 4/24/2019  [facility] cancer ([facility]  Hypertension  Lung cancer ([address]  Parkinson disease ([address]  Prostate cancer ([facility] Past Surgical History:  
Procedure Laterality Date  HX COLOSTOMY  HX PROSTATECTOMY Family History:  
Family history unknown: Yes  
 
 
Social History Socioeconomic History  Marital status:  Spouse name: Not on file  Number of children: Not on file  Years of education: Not on file  Highest education level: Not on file Occupational History  Not on file Social Needs  Financial resource strain: Not on file  Food insecurity:  
  Worry: Not on file Inability: Not on file  Transportation needs:  
  Medical: Not on file Non-medical: Not on file Tobacco Use  Smoking status: Former Smoker Start date: 11/21/2010  Smokeless tobacco: Never Used Substance and Sexual Activity  Alcohol use: Yes Alcohol/week: 0.6 oz Types: 1 Cans of beer per week Frequency: Never Comment: occ  Drug use: No  
 Sexual activity: Never Lifestyle  Physical activity:  
  Days per week: Not on file Minutes per session: Not on file  Stress: Not on file Relationships  Social connections:  
  Talks on phone: Not on file Gets together: Not on file Attends Amish service: Not on file Active member of club or organization: Not on file Attends meetings of clubs or organizations: Not on file Relationship status: Not on file  Intimate partner violence:  
  Fear of current or ex partner: Not on file Emotionally abused: Not on file Physically abused: Not on file Forced sexual activity: Not on file Other Topics Concern  Not on file Social History Narrative ** Merged History Encounter ** ALLERGIES: Patient has no known allergies. Review of Systems Unable to perform ROS: Dementia There were no vitals filed for this visit. Physical Exam  
Constitutional: He appears well-developed and well-nourished. No distress. Elderly, frail appearing HENT:  
Head: Normocephalic and atraumatic. Eyes: Pupils are equal, round, and reactive to light. EOM are normal.  
Neck: Normal range of motion. Neck supple. Cardiovascular: Normal rate. Pulmonary/Chest: Effort normal and breath sounds normal.  
Abdominal: Soft. There is no tenderness. Neurological: GCS eye subscore is 4. GCS verbal subscore is 5. GCS motor subscore is 6. Skin: Skin is warm and dry. Psychiatric: He has a normal mood and affect. His speech is delayed. He is withdrawn. Cognition and memory are impaired. He exhibits abnormal recent memory and abnormal remote memory. Nursing note and vitals reviewed. MDM Number of Diagnoses or Management Options Acute cystitis with hematuria:  
Diagnosis management comments: A/P:  Sundowning, agitation, UTI, ICH. 79 y/o male with recent agitation while being admitted to SNF at [facility]. Concern for sundowning as pt is in an new environment however pt has had recent falls with recent hospitalization so will need to rule out metabolic causes vs. ICH. CT head, ua, drug screen, cbc, cmp, lipase, mag. Amount and/or Complexity of Data Reviewed Clinical lab tests: ordered and reviewed Tests in the radiology section of CPT®: reviewed and ordered Review and summarize past medical records: yes Discuss the patient with other providers: yes Independent visualization of images, tracings, or specimens: yes Risk of Complications, Morbidity, and/or Mortality Presenting problems: moderate Diagnostic procedures: moderate Management options: moderate Patient Progress Patient progress: stable Procedures PROGRESS NOTE: 
12:18 PM 
Nurse spoke to [facility]. They said that pt had multiple falls over the weekend and has had a progressive decline in mental status. 2:32 PM 
Discussed results with wife regarding pt having a UTI. Discussed that pt can be discharged to [facility] but wife requested that the pt be sent somewhere else. She is not happy with level of care at [facility]. Discussed with  who will look for placement elsewhere. Case Management has met with the wife and will look for other SNF that patient was screened for during last admission.

## 2019-04-29 NOTE — ED NOTES
Spoke with João Rosen at [facility] regarding patient recent history. Per João Rosen, patient had a fall last night and multiple falls over the weekend with a recent change in mental status. Patient has become more quiet than normal and had a combative episode last night, which is unusual for him. Patient took 6am meds this morning, which included Sinemet. Patient refused 10 am meds.

## 2019-05-06 PROBLEM — G93.41 ACUTE METABOLIC ENCEPHALOPATHY: Status: ACTIVE | Noted: 2019-01-01

## 2019-05-06 PROBLEM — R05.9 COUGH: Status: ACTIVE | Noted: 2019-01-01

## 2019-05-06 PROBLEM — J69.0 ASPIRATION PNEUMONIA (HCC): Status: ACTIVE | Noted: 2019-01-01

## 2019-05-06 NOTE — DISCHARGE SUMMARY
Roderick Luke [address] [place] [address] 
[address] Tel: [phone] Physician Discharge Summary Patient ID:    Roc Watts Age:              80 y.o.    : 1935 MRN:             [id number] PCP: Christiano Ovalle MD  
 
Admit date: 2019 Discharge date: 2019 Principal admission Diagnosis: 
 Acute metabolic encephalopathy [Y34.47] Discharge Diagnoses: 
Principal Problem: 
  Acute metabolic encephalopathy () Parkinson disease ([address] (2019) Aspiration pneumonia / Cough (2019) HTN (hypertension), benign (2019) Physical debility (2019) Consults: Palliative Care Hospital Course: Mr. Kenny Lawson is a 80 y.o. admitted earlier to [facility] with acute metabolic encephalopathy with likely aspiration. He was reviewed by palliative care and will now be admitted to hospice. Further recommendations as per hospice. Discharge Exam:   
Visit Vitals /51 (BP 1 Location: Right arm, BP Patient Position: At rest) Pulse 85 Temp 98.8 °F (37.1 °C) Resp 16 Wt 77.1 kg (170 lb) SpO2 99% BMI 24.39 kg/m² Activity: complete bed rest  
 
Diet: Comfort feeding Medications: per hospice protocol Discharge Condition: Critical  
 
Disposition: inpatient hospice Signed: 
MD [facility] 2019   2:23 PM

## 2019-05-06 NOTE — PROGRESS NOTES
5/6/2019 
1:52 PM 
Case management note Reason for Readmission:     Acute metabolic encephalpathy 4/29/19 ED cyctitis 4/22/19 -4/26/19 loss of consciousness 4/22/19 ED colostomy dysfunction Patient had been at [address] [address] for rehab. Had been on antibiotic . Patient became sob, sleepy, congested. His BP was low and and heart rate high. EMS was called and brought to ER. Patient wife was very upset upon her arrival. She is very weepy and asking was Hao Him going to be OK\" Spoke with her about goals of care. Patient had also been at [address] [place] [address] recently as well. She stated that both \" did not take care of her . \" 
Patient is being admitted. Daughter is now at bedside. Palliative has been consulted as well. RRAT Score and Risk Level:     10 Level of Readmission:    Kassandra Cinnamon Care Conference scheduled:    
    
Resources/supports as identified by patient/family:   Family, wife and daughter at bedside Top Challenges facing patient (as identified by patient/family and CM): Finances/Medication cost?     None noted Transportation      Family / ambulance Support system or lack thereof?     family Living arrangements? Currently patient has been at [facility] and [facility] Self-care/ADLs/Cognition? Unable to do self care, poor health cognition Current Advanced Directive/Advance Care Plan:  DNR on file Plan for utilizing home health: At baseline for ADL's Likelihood of additional readmission:   High due to disease process, poor health cognition and debility Transition of Care Plan:    Based on readmission, the patient's previous Plan of Care 
 has been evaluated and/or modified. The current Transition of Care Plan is: 1. Return to SNF 2. Palliative / Hospice 3. Long term goals of care 4. CM to follow until discharge Care Management Interventions PCP Verified by CM: Yes(dr. wheat) Mode of Transport at Discharge: BLS Transition of Care Consult (CM Consult): Discharge Planning Current Support Network: Lives with Spouse, [facility] Confirm Follow Up Transport: Other (see comment) Plan discussed with Pt/Family/Caregiver: Yes Discharge Location Discharge Placement: Skilled nursing facility [facility], [address]

## 2019-05-06 NOTE — ACP (ADVANCE CARE PLANNING)
Primary Decision Maker: Daysi Lowery - Spouse - [phone] Advance Care Planning 5/6/2019 Patient's Healthcare Decision Maker is: Legal Next of Kin: Wife Jerri Ibrahim, [phone] Confirm Advance Directive Yes, not on file Patient Would Like to Complete Advance Directive Already in place, per wife; copy is not on file Does the patient have other document types Durable Do Not Resuscitate Pt reportedly completed AMD in past which appoints wife Temo Mock as surrogate decision maker; copy is not on file. Pt has DDNR in place, copy has been scanned into medical record. Family has made decision to enroll pt in hospice care; wife's goal is to transition pt home, per his wishes.

## 2019-05-06 NOTE — ED PROVIDER NOTES
80 y.o. male with past medical history significant for Parkinson's disease, HTN, anemia, prostate CA, colon CA, lung CA, who presents via EMS from [facility] to the ED with cc of hypoxia and AMS. Pt is a DNR and a poor historian due to dementia. Per chart, pt had recent admission between 4/22/19 - 4/26/19 for syncope and AMS, during which he had an unremarkable EEG and brain MRI with \"findings consistent with acute infarct. \" Pt was discharged to rehabilitation at [facility]. He was then seen at this ED one week ago on 4/29/19 for AMS, at which time he was diagnosed with acute cystitis and discharged on Bactrim to another facility, [facility], at the request of his wife. Today, pt was noted to be hypoxic and generally weak at the facility during morning assessment of vital signs. EMS reports pt was placed on 4L O2 via NC with SpO2 of 92%. EMS notes pt does not use O2 at baseline. EMS states pt was verbal en route, but upon arrival has had decreased responsiveness. Full history, physical exam, and ROS unable to be obtained due to:  dementia and unresponsive verbally. Social Hx: former Tobacco use, occasional EtOH use, denies Illicit Drug use PCP: Racheal Bower MD 
 
Note written by Maria Victoria Best, as dictated by Arturo Chery MD 10:17 AM 
 
 
The history is provided by the EMS personnel and medical records. No  was used. Past Medical History:  
Diagnosis Date  'light-for-dates' infant with signs of fetal malnutrition  Anemia 4/24/2019  Colon [facility] ([facility]  Hypertension  Lung cancer ([facility]  Parkinson disease ([facility]  Prostate cancer ([facility] Past Surgical History:  
Procedure Laterality Date  HX COLOSTOMY  HX PROSTATECTOMY Family History:  
Family history unknown: Yes  
 
 
Social History Socioeconomic History  Marital status:  Spouse name: Not on file  Number of children: Not on file  Years of education: Not on file  Highest education level: Not on file Occupational History  Not on file Social Needs  Financial resource strain: Not on file  Food insecurity:  
  Worry: Not on file Inability: Not on file  Transportation needs:  
  Medical: Not on file Non-medical: Not on file Tobacco Use  Smoking status: Former Smoker Start date: 11/21/2010  Smokeless tobacco: Never Used Substance and Sexual Activity  Alcohol use: Yes Alcohol/week: 0.6 oz Types: 1 Cans of beer per week Frequency: Never Comment: occ  Drug use: No  
 Sexual activity: Never Lifestyle  Physical activity:  
  Days per week: Not on file Minutes per session: Not on file  Stress: Not on file Relationships  Social connections:  
  Talks on phone: Not on file Gets together: Not on file Attends Baptism service: Not on file Active member of club or organization: Not on file Attends meetings of clubs or organizations: Not on file Relationship status: Not on file  Intimate partner violence:  
  Fear of current or ex partner: Not on file Emotionally abused: Not on file Physically abused: Not on file Forced sexual activity: Not on file Other Topics Concern  Not on file Social History Narrative ** Merged History Encounter ** ALLERGIES: Patient has no known allergies. Review of Systems Unable to perform ROS: Mental status change (also Dementia) Vitals:  
 05/06/19 1024 05/06/19 1136 BP: 105/51 Pulse: 85 Resp: 16 Temp: 98.8 °F (37.1 °C) SpO2: 90% 99% Weight: 77.1 kg (170 lb) Physical Exam  
Constitutional:  
Frail, ill-apearing HENT:  
Head: Normocephalic and atraumatic. Mouth/Throat: Mucous membranes are dry. Eyes: Pupils are equal, round, and reactive to light. EOM are normal.  
L eye with conjunctivitis Neck: No JVD present. No neck rigidity. Cardiovascular: Intact distal pulses. Pulmonary/Chest: Effort normal.  
Tachypnea, diminished r base Limited due to inability to follow commands. Abdominal: He exhibits no distension. Flat, no apparent tenderness. Ileostomy in place with drainage in bag Musculoskeletal: He exhibits no edema. Neurological: No cranial nerve deficit. Unresponsive to verbal stimulus, grimaces to painful or noxious stimulus Skin: Skin is warm. He is not diaphoretic. Psychiatric:  
Unable to test  
Nursing note and vitals reviewed. Note written by Maria Victoria Perez, as dictated by Cecil Girard MD 10:17 AM 
 
MDM Number of Diagnoses or Management Options Pneumonia of right lower lobe due to infectious organism [facility]):  
Diagnosis management comments: J Carlos Burton is a 80 y.o. male presenting with acute encephalopathy, more likely metabolic 2/2 infection, doubt acute cva, no trauma. Differential includes pna, pneumonitis, delirium, uti, ssti, amongst others. Course: ivf, abx for presumed aspiration pna. Dispo: admission. Risk of Complications, Morbidity, and/or Mortality Presenting problems: moderate Diagnostic procedures: moderate Management options: moderate Critical Care Total time providing critical care: (65 min) Procedures ED EKG interpretation: 
NSR, PACs, rate 91, no ST/T wave changes. Note written by Maria Victoria Perez, as dictated by Cecil Girard MD 10:44 AM  
 
12:21 PM 
Old Chart Review: Pt has been on Augmentin since 5/4/19, first dose at 9am. He also was on Bactrim from 4/30/19 to 5/3/19. CONSULT NOTE: 
12:24 PM Cecil Girard MD spoke with Dr. Yuliana Queen, Consult for Hospitalist.  Discussed available diagnostic tests and clinical findings. Dr. Yuliana Queen accepts pt for admission.  
 
ADMIT NOTE: 
12:35 PM 
Patient is being admitted to the hospital.  The results of their tests and reasons for their admission have been discussed with them and/or available family. They convey agreement and understanding for the need to be admitted and for their admission diagnosis.

## 2019-05-06 NOTE — ACP (ADVANCE CARE PLANNING)
Advance Care Planning Note Name:                 Kenya Dobson YOB: 1935 MRN:                  [id number] Admission Date: 5/6/2019 10:18 AM 
 
Date of discussion: 5/6/2019 Active Diagnoses: 
 
Principal Problem: 
  Acute metabolic encephalopathy (4/7/5887) Aspiration pneumonia Parkinson disease ([facility] (4/22/2019) HTN (hypertension), benign (4/22/2019) Physical debility (4/22/2019) Cough (5/6/2019) These active diagnoses are of sufficient risk that focused discussion on advance care planning is indicated in order to allow the patient to thoughtfully consider personal goals of care, and if situations arise that prevent the ability to personally give input, to ensure appropriate representation of their personal desires for different levels and aggressiveness of care. Discussion:  
 
Persons present and participating in discussion: Enoch Dean MD, Ms Karl Arreola and her daughter Discussion: Patient's severe illness and encephalopathy discussed with family who noted worsening of his generalized health. Recently discharged from hospital and was a DNR then but wife seemed unsure initially if this should still be the case. Patient's daughter understood the clinical picture and requested time to discuss with mother and later with palliative care. Patient had a DDNR already on chart. Plan was to admit  to hospice Time Spent:  
 
Total time spent face-to-face in education and discussion: 16 minutes. Enoch Dean MD 
5/6/2019 5:30 PM

## 2019-05-06 NOTE — ED NOTES
Patient Throughput:  Charge nurse on 5th floor made aware of patient's room assignment, room 530 Sadie Mcghee RN Shift Resource Nurse Emergency Department

## 2019-05-06 NOTE — ED NOTES
TRANSFER - OUT REPORT: 
 
Verbal report given to Wilma on Pam Barrier  being transferred to 65 for routine progression of care Report consisted of patients Situation, Background, Assessment and  
Recommendations(SBAR). Information from the following report(s) SBAR, ED Summary, [facility] and Recent Results was reviewed with the receiving nurse. Opportunity for questions and clarification was provided.

## 2019-05-06 NOTE — H&P
[address] 
[phone] Admission History and Physical 
 
 
NAME:              Kinjal Lunsford :   1935 MRN:  [id number] PCP:  Juarez Chow MD  
 
Date:     2019 Chief  Complaint: AMS, hypoxia History Of Presenting Illness: Mr. Fermin Ott is a 80 y.o. male who is being admitted for acute metabolic encephalopathy and suspected aspiration. Mr. Fermin Ott is currently minimally responsive and is a poor historian. I have discussed with the patient's daughter and wife for collaborative hx. The patient is a resident at the [address] [place] [address] where he was apparently noted to be hypoxic and with ALOC. Wife states that he had fallen down twice since yesterday and she did call earlier in the day to find out how he was doing and was told well only to receive a call thereafter that he was very ill. He has been coughing. No fever. CXR done in the ED was suggestive of a RLL pneumonia. WBCs was normal. Of note, he was recently discharged from hospital for episodes of LOC and were then attributed to syncope, less likely seizures. He was evaluated by Neurology and EEG was unremarkable. Neurology agreed that these episodes were likely due to autonomic dysfunction related to his Parkinsons disease. He will be admitted for further management. During prior admission he was a DNR but wife seems undecided at this time. Daughter says he should be a DNR and palliative care yet to see him. No Known Allergies Prior to Admission medications Medication Sig Start Date End Date Taking? Authorizing Provider  
aspirin 81 mg chewable tablet Take 1 Tab by mouth daily. 19   Wero Harrell MD  
atorvastatin (LIPITOR) 40 mg tablet Take 1 Tab by mouth nightly.  19   Wero Harrell MD  
 carbidopa-levodopa ER (SINEMET CR)  mg per tablet Take 1 Tab by mouth nightly. Provider, Historical  
fludrocortisone (FLORINEF) 0.1 mg tablet Take 0.1 mg by mouth daily. Provider, Historical  
 
 
Past Medical History:  
Diagnosis Date  'light-for-dates' infant with signs of fetal malnutrition  Anemia 4/24/2019  Colon cancer ([facility]  Hypertension  Lung cancer ([facility]  Parkinson disease ([facility]  Prostate cancer ([facility] Past Surgical History:  
Procedure Laterality Date  HX COLOSTOMY  HX PROSTATECTOMY Social History Tobacco Use  Smoking status: Former Smoker Start date: 11/21/2010  Smokeless tobacco: Never Used Substance Use Topics  Alcohol use: Yes Alcohol/week: 0.6 oz Types: 1 Cans of beer per week Frequency: Never Comment: occ Family History Family history unknown: Yes Review of Systems:  Unobtainable from the patient due to AMS Examination: 
 
Constitutional:   
Visit Vitals /51 (BP 1 Location: Right arm, BP Patient Position: At rest) Pulse 85 Temp 98.8 °F (37.1 °C) Resp 16 Wt 77.1 kg (170 lb) SpO2 99% BMI 24.39 kg/m² General:  Weak and critically ill male patient, minimally responsive Eyes: Eyes closed but no overt discharge Ear, Nose, Mouth & Throat: No ottorrhea, rhinorrhea, non tender sinuses, dry mucous membranes Respiratory:  No accessory muscle use, generally decreased breath sounds with transmitted sounds bilaterally. No wheezes Cardiovascular:  No JVD or murmurs, regular and normal S1, S2 without thrills, bruits or peripheral edema. Capillary refil+, good distal pulses GI & :  Soft abdomen, non-tender, non-distended, normoactive bowel sounds with no palpable organomegaly Heme:  No cervical or axillary adenopathy. Musculoskeletal:  No cyanosis, clubbing, atrophy or deformities Skin:  No rashes. Bruising lower extremity Neurological: lethargic, moves extremities to pain, does not open eyes, does not follow commands Psychiatric:  Has a poor insight to his illness  
________________________________________________________________________ Data Review: 
 
Labs: 
 
Recent Labs 05/06/19 
1054 WBC 8.3 HGB 8.7* HCT 29.5*  
 Recent Labs 05/06/19 
1054   
K 3.9  CO2 29 GLU 88 BUN 23* CREA 0.42* CA 8.5 ALB 3.2* SGOT 9* ALT 10* No components found for: [place] No results for input(s): PH, PCO2, PO2, HCO3, FIO2 in the last 72 hours. No results for input(s): INR in the last 72 hours. No lab exists for component: INREXT Radiological Studies:   
 
Chest X-ray - probable right lower lobe pneumonia Other Medical tests: 
 
Personally reviewed 12 lead EKG: Normal rate, rhythm, axis and intervals. and No acute changes suggestive of ischemia I have also reviewed available old medical records. Assessment & Impression: Mr. Rubén Olsen is a 80 y.o. male being evaluated for:  
 
Principal Problem: 
  Acute metabolic encephalopathy (7/9/0279) Active Problems: 
  Parkinson disease ([address] (4/22/2019) HTN (hypertension), benign (4/22/2019) Physical debility (4/22/2019) Cough (5/6/2019) Plan of management: 
 
Acute metabolic encephalopathy (3/2/4570): seems sudden as per family although likely has been ongoing. Recent neurological work up done. Suspect this could be multifactorial from aspiration, hypoxia, his underlying parkinson's disease vs new CVA. Discussed with family and they seem unclear as to what needs to be done. Palliative care to see shortly. If family agreeable to hospice, then they will assume further care but if not, admit to hospital. Treat suspected aspiration and ask neurology to re-evaluate Aspiration pneumonia / Cough (5/6/2019): start IV Zosyn for now pending decision by family re: goals of care Parkinson disease ([facility] (4/22/2019): severe. Associated dysautonomia. Supportive care for now. Resume Florinef HTN (hypertension), benign (4/22/2019): No BP medications. Monitor Physical debility (4/22/2019): very deconditioned. Not sure he can tolerate much activity. Hospice appropriate. Palliative care to see Code Status: DNR Surrogate decision maker: Family Risk of deterioration: high Total time spent for the care of the patient: 79 Minutes Care Plan discussed with: Family, Nursing Staff and ED physician Discussed:  Code Status, Care Plan and D/C Planning Prophylaxis:  Lovenox Probable Disposition:  SNF/LTC vs hospice 
        
___________________________________________________ Attending Physician: Loraine Almaraz MD

## 2019-05-06 NOTE — H&P
[address] [place] [address] [facility] 
[phone] Patient Name: Jenn Garsia YOB: 1935 Date of Provider Hospice Visit: 05/06/19 Level of Care:   [x] General Inpatient (GIP)    [] Routine   [] Respite Current Location of Care: 
[] [facility] [x] [facility] [] [facility] [] [facility] [] Hospice [facility], patient referred from: 
[] [facility] [] [facility] [] [facility] [] [facility] [] Home [] Other:  
 
Date of Original Hospice Admission: 5/6/2019 Hospice Medical Director at time of admission: Josue Vences Principle Hospice Diagnosis: Aspiration pneumonia Diagnoses RELATED to the terminal prognosis: Advanced Parkinson's, malnutrition, metabolic encephalopathy, recurrent admissions, UTI Other Diagnoses: Erin Ji Jenn Garsia is a 80y.o. year old who was admitted to [address] [place] [address]. Patient is an 80-year-old gentleman with advanced Parkinson's disease who has had multiple admissions to the hospital over the last 4 to 6 weeks secondary to either a urinary tract infection or aspiration pneumonia or multiple falls. He presents back from skilled nursing facility with altered mental status, likely urinary tract infection likely aspiration pneumonia. He is minimally responsive. Extensive discussion had with his wife and daughter about his current medical issues and ongoing decline. His daughter clearly understands the trajectory is heading downward with likely recurrent admissions in his foreseeable future. His wife remains hopeful that he will recover but ultimately does agree to hospice care and the support they can provide for her at home. We had concerns about his discharge this evening given his shortness of breath and rapid decline from the facility. She is agreed to inpatient hospice admission for symptom management this evening with goal of potential discharge home tomorrow.  
 
We do have some concerns about his wife's cognition as she asked questions that have been answered multiple times The patient's principle diagnosis has resulted in aspiration pneumonia, urinary tract infection Refer to LCD Functionally, the patient's Karnofsky and/or Palliative Performance Scale has declined over a period of weeks to months and is estimated at 20 the patient is dependent on the following ADLs: All Objective information that support this patients limited prognosis includes:  
Chest x-ray with right lower lobe pneumonia The patient/family chose comfort measures with the support of Hospice. HOSPICE DIAGNOSES Active Symptoms: 1. Shortness of breath 2. Restlessness with some agitation 3. Secretions 4. Parkinson's disease PLAN 1. Patient will be admitted GIP level of care for frequent nursing assessment and symptom management. 2. Shortness of breath patient did receive a dose of morphine earlier but the family thought this made him \"shaky \". We will plan on using Dilaudid both IV and sublingual as needed. 3. Restlessness with agitationAtivan as needed will be used. 4. Comfort order set placed for other symptoms 5. Plan discussed extensively and on multiple occasions with both spouse and daughter. Plan hopefully will be discharged tomorrow to home as requested by spouse. We will need to make sure equipment is delivered to the house prior to discharge 6.  and SW to support family needs 7. Disposition: Home Prognosis estimated based on 05/06/19 clinical assessment is:  
[] Hours to Days [x] Days to Weeks   
[] Other: 
 
Communicated plan of care with: Hospice Case Manager; Hospice IDT; Care Team 
 
 GOALS OF CARE Patient/Medical POA stated Goal of Care: Hospice care 
[x] I have reviewed and/or updated ACP information in the Advance Care Planning Navigator. This information is available in the [address] link in the patient's chart header.  
 
Primary Decision [facility] (Health Care Agent):   Primary Decision Maker: Daysi Lowery - Spouse - [phone] Resuscitation Status: DNR If DNR is there a Durable DNR on file? : [x] Yes [] No (If no, complete Durable DNR) HISTORY History obtained from: Daughter, spouse, chart CHIEF COMPLAINT: Confusion The patient is:  
[] Verbal 
[] Nonverbal 
[x] Unresponsive HPI/SUBJECTIVE: Patient currently minimally responsive. He was moaning a little bit in the emergency room REVIEW OF SYSTEMS The following systems were: [x] reviewed  [] unable to be reviewed Positive ROS include: 
Constitutional: fatigue, weakness, short of breath Ears/nose/mouth/throat: increased airway secretions Respiratory:shortness of breath, 
Gastrointestinal:poor appetite, Neurologic:confusion,  weakness Adult Non-Verbal Pain Assessment Score: 2/10 Face 
[] 0   No particular expression or smile 
[x] 1   Occasional grimace, tearing, frowning, wrinkled forehead 
[] 2   Frequent grimace, tearing, frowning, wrinkled forehead Activity (movement) [] 0   Lying quietly, normal position 
[x] 1   Seeking attention through movement or slow, cautious movement 
[] 2   Restless, excessive activity and/or withdrawal reflexes Guarding 
[x] 0   Lying quietly, no positioning of hands over areas of body 
[] 1   Splinting areas of the body, tense 
[] 2   Rigid, stiff Physiology (vital signs) [x] 0   Stable vital signs [] 1   Change in any of the following: SBP > 20mm Hg; HR > 20/minute 
[] 2   Change in any of the following: SBP > 30mm Hg; HR > 25/minute Respiratory [x] 0   Baseline RR/SpO2, compliant with ventilator 
[] 1   RR > 10 above baseline, or 5% drop SpO2, mild asynchrony with ventilator 
[] 2   RR > 20 above baseline, or 10% drop SpO2, asynchrony with ventilator FUNCTIONAL ASSESSMENT Palliative Performance Scale (PPS): 20 PSYCHOSOCIAL/SPIRITUAL ASSESSMENT Principal Problem: 
  Acute metabolic encephalopathy (4/9/5712) Active Problems: Parkinson disease ([facility] (4/22/2019) HTN (hypertension), benign (4/22/2019) Physical debility (4/22/2019) Cough (5/6/2019) Past Medical History:  
Diagnosis Date  'light-for-dates' infant with signs of fetal malnutrition  Anemia 4/24/2019  Colon cancer ([facility]  Hypertension  Lung cancer ([facility]  Parkinson disease ([facility]  Prostate cancer ([facility] Past Surgical History:  
Procedure Laterality Date  HX COLOSTOMY  HX PROSTATECTOMY Social History Tobacco Use  Smoking status: Former Smoker Start date: 11/21/2010  Smokeless tobacco: Never Used Substance Use Topics  Alcohol use: Yes Alcohol/week: 0.6 oz Types: 1 Cans of beer per week Frequency: Never Comment: occ Family History Family history unknown: Yes No Known Allergies Current Facility-Administered Medications Medication Dose Route Frequency  sodium chloride (NS) flush 5-10 mL  5-10 mL IntraVENous PRN  
 sodium chloride (NS) flush 5-40 mL  5-40 mL IntraVENous Q8H  
 sodium chloride (NS) flush 5-40 mL  5-40 mL IntraVENous PRN  
 acetaminophen (TYLENOL) tablet 650 mg  650 mg Oral Q4H PRN  
 naloxone (NARCAN) injection 0.4 mg  0.4 mg IntraVENous PRN  
 enoxaparin (LOVENOX) injection 40 mg  40 mg SubCUTAneous Q24H  piperacillin-tazobactam (ZOSYN) 3.375 g in 0.9% sodium chloride (MBP/ADV) 100 mL  3.375 g IntraVENous Q8H  
 ondansetron (ZOFRAN) injection 4 mg  4 mg IntraVENous Q4H PRN  
 ondansetron (ZOFRAN ODT) tablet 4 mg  4 mg Oral Q6H PRN  
 LORazepam (ATIVAN) injection 1 mg  1 mg IntraVENous Q15MIN PRN  
 LORazepam (INTENSOL) 2 mg/mL oral concentrate 1 mg  1 mg Oral Q1H PRN  
 acetaminophen (TYLENOL) tablet 650 mg  650 mg Oral Q4H PRN Or  
 acetaminophen (TYLENOL) solution 650 mg  650 mg Oral Q4H PRN  Or  
 acetaminophen (TYLENOL) suppository 650 mg  650 mg Rectal Q4H PRN  
 scopolamine (TRANSDERM-SCOP) 1 mg over 3 days 1 Patch  1 Patch TransDERmal Q72H PRN  
 glycopyrrolate (ROBINUL) injection 0.2 mg  0.2 mg IntraVENous Q4H PRN  
 morphine injection 10 mg  10 mg Inhalation Q1H PRN  
 morphine injection 2 mg  2 mg IntraVENous Q15MIN PRN Facility-Administered Medications Ordered in Other Encounters Medication Dose Route Frequency  ondansetron (ZOFRAN) injection 4 mg  4 mg IntraVENous Q4H PRN  prochlorperazine (COMPAZINE) injection 10 mg  10 mg IntraVENous Q4H PRN  
 LORazepam (ATIVAN) injection 1 mg  1 mg IntraVENous Q15MIN PRN  
 LORazepam (INTENSOL) 2 mg/mL oral concentrate 1 mg  1 mg Oral Q1H PRN  
 ketorolac (TORADOL) injection 30 mg  30 mg IntraVENous Q8H PRN  
 glycopyrrolate (ROBINUL) injection 0.2 mg  0.2 mg IntraVENous Q4H PRN  
 scopolamine (TRANSDERM-SCOP) 1 mg over 3 days 1 Patch  1 Patch TransDERmal Q72H PRN  
 bisacodyl (DULCOLAX) suppository 10 mg  10 mg Rectal DAILY PRN  
 HYDROmorphone (DILAUDID) 1 mg/mL oral solution 2 mg  2 mg Oral Q1H PRN  
 HYDROmorphone (PF) (DILAUDID) injection 0.5 mg  0.5 mg IntraVENous Q15MIN PRN  
 
 
 PHYSICAL EXAM  
 
Wt Readings from Last 3 Encounters:  
05/06/19 170 lb (77.1 kg) 04/29/19 170 lb (77.1 kg) 04/23/19 170 lb (77.1 kg) Visit Vitals /67 Pulse 69 Temp 98.1 °F (36.7 °C) Resp 8 Wt 170 lb (77.1 kg) SpO2 99% BMI 24.39 kg/m² Supplemental O2  [x] Yes  [] NO Last bowel movement:  
 
Currently this patient has: 
[x] Peripheral IV [] PICC  [] PORT [] ICD [x] Baez Catheter [] NG Tube   [] PEG Tube   
[] Rectal Tube [] Drain 
[] Other:  
 
Constitutional: Cachectic, ill-appearing Eyes: Sunken ENMT: Dry 
Cardiovascular: Tachycardia Respiratory: Mild distress Gastrointestinal: Soft with colostomy Musculoskeletal: Muscle wasting Skin: Multiple areas of bruising Neurologic: Minimally responsive Psychiatric: Mildly restless Other:  
 
 
Pertinent Lab and or Imaging Tests: 
Lab Results Component Value Date/Time Sodium 142 05/06/2019 10:54 AM  
 Potassium 3.9 05/06/2019 10:54 AM  
 Chloride 107 05/06/2019 10:54 AM  
 CO2 29 05/06/2019 10:54 AM  
 Anion gap 6 05/06/2019 10:54 AM  
 Glucose 88 05/06/2019 10:54 AM  
 BUN 23 (H) 05/06/2019 10:54 AM  
 Creatinine 0.42 (L) 05/06/2019 10:54 AM  
 BUN/Creatinine ratio 55 (H) 05/06/2019 10:54 AM  
 GFR est AA >60 05/06/2019 10:54 AM  
 GFR est non-AA >60 05/06/2019 10:54 AM  
 Calcium 8.5 05/06/2019 10:54 AM  
 
Lab Results Component Value Date/Time  Protein, total 6.2 (L) 05/06/2019 10:54 AM  
 Albumin 3.2 (L) 05/06/2019 10:54 AM  
 
   
 
Total time: 70 
Counseling / coordination time:  
> 50% counseling / coordination?:

## 2019-05-06 NOTE — CONSULTS
Palliative Medicine Consult Patient Name: Roc Watts YOB: 1935 Date of Initial Consult: 5/6/2019 Reason for Consult: Goals of Care discussion Requesting Provider: Dr. Vicenta Pennington Primary Care Physician: Christiano Ovalle MD 
  
 SUMMARY:  
Roc Watts is a 80 y. o. 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 Mr. Cox Query presented to ER w/ report of being found by facility staff to have  Been hypoxic and with a change in his mental status. Per wifes report patient had also had multiple falls since being at facility as well as a new cough. Patient had recent hospitalization 4/12-4/26/19, due to acute change in mental status, where he was eventually  found to have a subacute CVA. During course of this hospitalization, patient had evidence of dysphagia, had modified diet of pureed foods. Palliative medicine had been consulted for goals of care discussion, was able to assist with patient signing DDNR. He  Was eventually discharged to rehab facility In ER patient appeared to be critically ill, weak and frail, minimally responsive, NAD. UA abnormal, CX pending. CXR obtained, revealed probably RLL PNA. Current medical issues leading to Palliative Medicine involvement include: Goals of care discussion in setting of ES Parkinson's Disease PALLIATIVE DIAGNOSES:  
1. Advanced Care Planning Discussion 2. Goals of Care Discussion 3. Altered Mental status, unspecified 4. Shortness of breath 5. Pain 6. Psycho-social distress PLAN:  
1. Reviewed patients medical records prior to visit, was seen by palliative during last hospitalization 4/22-4/26/2019.  Patient was seen and evaluated, he was minimally responsive, unable to participate in discussion. Palliative team,. Including Dr. Crystal Salinas and myself met  with the patients wife and daughter Diana Schulte. Introduced ourselves and role of palliative medicine. Assessed her understanding of course of events leading up to hospitalization, shared with her results of CXR and likely aspiration PNA. Spent time answering her questions about dysphagia,diagnosis and treatments, as well as the relationship of underlying parkinson's and progressive weakness. We also spent time hearing his wifes concerns and his wishes to go home. 2. Advanced Care Planning Discussion- Patient does not have an AMD in his medical record, his wife is the legal NOK. Patient signed a DDNR during last hospitalization, a copy of which is in EMR. 3. Goals of Care Discussion- spent time discussing the patients disease trajectory, that he has had increased frequency of hospitalizations, not returning to pre-hospital baseline, rather he has continued to decline. · Discussed options of care, including continuing full restorative treatments and interventions vs comfort focused care, which could include Hospice. Answered questions regarding benefits vs burdens of current course of full restorative tx, explaining patient has continued to decline despite full restorative efforts, that continuing on this path will likely mean he will continue to have frequent hospitalizations. We answered questions regarding comfort focused care, would not be treating infections, not giving IV fluids.  Hospice services at home, explained that Hospice does NOT provide ATC care, that she would need to be able to provide his care, likely need to hire a caregiver for assistance, and how Hospice was able to assist with comfort focused care in the home, adding an extra layer of support, oncall staff if there are problems that need to be addressed etc.  
· Daughter asked if the patient was appropriate for Hospice, we explained that he was Hospice appropriate. · Wife stated that she was interested in having patient being admitted onto Hospice. We discussed admitting him onto Hospice here in the hospital, watch him to see what his symptom management needs might be, meanwhile palliative will consult hospice and they can make arrangements with his wife, order DME etc in anticipation of discharge home in the coming days. Both the patients wife and daughter verbalized their understanding of this plan. · Hospice consultation ordered · Order for comfort only care placed, patient being admitted in-patient hospice 4. Comfort order set ordered,including meds for SOB, Anxiety, Pain, Pulmonary Secretions and fever ordered, as well as O2 nasal cannula as needed and comfort foods. 5. Psycho-social concerns- Daughter has insight to fathers deteriorating condition, verbalized her agreement that he would most benefit from Hospice. However, team does have concerns regarding Mrs. Burgess memory, not clear if this is due to emotional distress, but as seen with last hospitalization, she returned later in the day, asking questions and verbalizing concerns, she seemed to forget what was previously discussed. Hospice admission nurse stated that she had met with Mrs. Lori Hoang, but that she refused to sign the paperwork, though earlier she had told the palliative team she was in agreement to Hospice services. . We asked her daughter if she had concerns regarding her mothers memory or if she thought it was related to the stress of husbands repeated hospitalizations, daughter stated she thinks it is r/t stress vs some other underlying etiology. Later, able to review the situation again with spouse who agrees again to proceed with hospice care. 6. Initial consult note routed to primary continuity provider 7. Communicated plan of care with: Palliative IDT, ER Care Manager, Referring Physician and attending, as well as hospice liaison nurse Flex Vega. GOALS OF CARE / TREATMENT PREFERENCES:  
[====Goals of Care====] GOALS OF CARE: 
  
 
 
TREATMENT PREFERENCES:  
Code Status: DNR Advance Care Planning: 
Advance Care Planning 4/24/2019 Patient's Healthcare Decision Maker is: Verbal statement (Legal Next of Kin remains as decision maker) Confirm Advance Directive Yes, not on file Patient Would Like to Complete Advance Directive - Does the patient have other document types Do Not Resuscitate Other Instructions: The palliative care team has discussed with patient / health care proxy about goals of care / treatment preferences for patient. 
[====Goals of Care====] HISTORY:  
 
History obtained from: medical records/wife CHIEF COMPLAINT: N/A 
 
HPI/SUBJECTIVE: The patient is:  
[] Verbal and participatory [x] Non-participatory due to:  
Patient minimally responsive, unable to provide ROS See Palliative ESAS from today 5/6/2019 Clinical Pain Assessment (nonverbal scale for severity on nonverbal patients): Adult Nonverbal Pain Scale Face: Occasional grimace, tearing, frowning, wrinkled forehead Activity (Movement): Seeking attention through movement or slow, cautious movement Guarding: Rigid, stiff Physiology (Vital Signs): Stable vital signs Respiratory: RR > 10 above baseline or 5% decrease SpO2 mild asynchrony with ventilator Total Score: 5 FUNCTIONAL ASSESSMENT:  
 
Palliative Performance Scale (PPS): PSYCHOSOCIAL/SPIRITUAL SCREENING:  
 
Advance Care Planning: 
Advance Care Planning 4/24/2019 Patient's Healthcare Decision Maker is: Verbal statement (Legal Next of Kin remains as decision maker) Confirm Advance Directive Yes, not on file Patient Would Like to Complete Advance Directive - Does the patient have other document types Do Not Resuscitate Any spiritual / Bahai concerns: 
[] Yes /  [x] No 
 
Caregiver Burnout: 
[] Yes /  [x] No /  [] No Caregiver Present Anticipatory grief assessment:  
[x] Normal  / [] Maladaptive ESAS Anxiety: ESAS Depression:    
 
 
 REVIEW OF SYSTEMS:  
 
Positive and pertinent negative findings in ROS are noted above in HPI. The following systems were [] reviewed / [x] unable to be reviewed as noted in HPI Other findings are noted below. Systems: constitutional, ears/nose/mouth/throat, respiratory, gastrointestinal, genitourinary, musculoskeletal, integumentary, neurologic, psychiatric, endocrine. Positive findings noted below. Modified ESAS Completed by: provider PHYSICAL EXAM:  
 
From RN flowsheet: 
Wt Readings from Last 3 Encounters:  
05/06/19 170 lb (77.1 kg) 04/29/19 170 lb (77.1 kg) 04/23/19 170 lb (77.1 kg) Blood pressure 105/51, pulse 85, temperature 98.8 °F (37.1 °C), resp. rate 16, weight 170 lb (77.1 kg), SpO2 99 %. Pain Scale 1: Adult Nonverbal Pain Scale Last bowel movement, if known:  
 
Constitutional: Thin, frail, chronically ill, elderly man, NAD Eyes: pupils equal, anicteric ENMT: no nasal discharge, moist mucous membranes Cardiovascular: regular rhythm, distal pulses intact Respiratory: breathing not labored, symmetric, nasal cannula O2 Gastrointestinal: soft non-tender, +bowel sounds, + Colostomy Musculoskeletal: no deformity, no tenderness to palpation Skin: warm, dry Neurologic: Minimally responsive, opens eyes briefly, attempted to respond to question x1, unintelligible, weak, lethargic Psychiatric: unable to assess due to AMS Other: 
 
 
 HISTORY:  
 
Principal Problem: 
  Acute metabolic encephalopathy (0/9/2765) Active Problems: 
  Parkinson disease ([facility] (4/22/2019) HTN (hypertension), benign (4/22/2019) Physical debility (4/22/2019) Cough (5/6/2019) Past Medical History:  
Diagnosis Date  'light-for-dates' infant with signs of fetal malnutrition  Anemia 4/24/2019  Colon cancer ([facility]  Hypertension  Lung cancer ([facility]  Parkinson disease ([facility]  Prostate cancer ([facility] Past Surgical History:  
Procedure Laterality Date  HX COLOSTOMY  HX PROSTATECTOMY Family History Family history unknown: Yes History reviewed, no pertinent family history. Social History Tobacco Use  Smoking status: Former Smoker Start date: 11/21/2010  Smokeless tobacco: Never Used Substance Use Topics  Alcohol use: Yes Alcohol/week: 0.6 oz Types: 1 Cans of beer per week Frequency: Never Comment: occ No Known Allergies Current Facility-Administered Medications Medication Dose Route Frequency  sodium chloride (NS) flush 5-10 mL  5-10 mL IntraVENous PRN  
 ondansetron (ZOFRAN) injection 4 mg  4 mg IntraVENous Q4H PRN  
 ondansetron (ZOFRAN ODT) tablet 4 mg  4 mg Oral Q6H PRN  
 LORazepam (ATIVAN) injection 1 mg  1 mg IntraVENous Q15MIN PRN  
 LORazepam (INTENSOL) 2 mg/mL oral concentrate 1 mg  1 mg Oral Q1H PRN  
 acetaminophen (TYLENOL) tablet 650 mg  650 mg Oral Q4H PRN Or  
 acetaminophen (TYLENOL) solution 650 mg  650 mg Oral Q4H PRN Or  
 acetaminophen (TYLENOL) suppository 650 mg  650 mg Rectal Q4H PRN  
 scopolamine (TRANSDERM-SCOP) 1 mg over 3 days 1 Patch  1 Patch TransDERmal Q72H PRN  
 glycopyrrolate (ROBINUL) injection 0.2 mg  0.2 mg IntraVENous Q4H PRN  
 morphine 10 mg/ml injection 10 mg  10 mg Inhalation Q1H PRN  
 morphine injection 2 mg  2 mg IntraVENous Q15MIN PRN No current outpatient medications on file. LAB AND IMAGING FINDINGS:  
 
Lab Results Component Value Date/Time WBC 8.3 05/06/2019 10:54 AM  
 HGB 8.7 (L) 05/06/2019 10:54 AM  
 PLATELET 812 02/13/2023 10:54 AM  
 
Lab Results Component Value Date/Time  Sodium 142 05/06/2019 10:54 AM  
 Potassium 3.9 05/06/2019 10:54 AM  
 Chloride 107 05/06/2019 10:54 AM  
 CO2 29 05/06/2019 10:54 AM  
 BUN 23 (H) 05/06/2019 10:54 AM  
 Creatinine 0.42 (L) 05/06/2019 10:54 AM  
 Calcium 8.5 05/06/2019 10:54 AM  
 Magnesium 1.7 04/29/2019 11:58 AM  
 Phosphorus 2.7 04/23/2019 02:52 AM  
  
Lab Results Component Value Date/Time AST (SGOT) 9 (L) 05/06/2019 10:54 AM  
 Alk. phosphatase 81 05/06/2019 10:54 AM  
 Protein, total 6.2 (L) 05/06/2019 10:54 AM  
 Albumin 3.2 (L) 05/06/2019 10:54 AM  
 Globulin 3.0 05/06/2019 10:54 AM  
 
No results found for: INR, PTMR, PTP, PT1, PT2, APTT Lab Results Component Value Date/Time Iron 74 04/24/2019 04:19 AM  
 TIBC 222 (L) 04/24/2019 04:19 AM  
 Iron % saturation 33 04/24/2019 04:19 AM  
 Ferritin 142 04/24/2019 04:19 AM  
  
No results found for: PH, PCO2, PO2 No components found for: [place] No results found for: CPK, CKMB Total time: 70 min Counseling / coordination time, spent as noted above: 50 min 
> 50% counseling / coordination?: yes Prolonged service was provided for  []30 min   []75 min in face to face time in the presence of the patient, spent as noted above. Time Start:  
Time End:  
Note: this can only be billed with 90357 (initial) or 29624 (follow up). If multiple start / stop times, list each separately.

## 2019-05-06 NOTE — ED TRIAGE NOTES
EMS called for hypoxia, weakness and tachycardia. Talking to EMS on ride over to [facility]. Patient responding to pain not following commands. On 4L new.

## 2019-05-06 NOTE — ED NOTES
Arrived with ostomy bag, bustos in place and multiple bandages/discolored skin bilateral arms and legs.

## 2019-05-06 NOTE — PROGRESS NOTES
[facility]: MED RECONCILIATION Update 1419: Called facility back to check on status, list is being re-faxed as was not received. Comments/Recommendations:  
? Pharmacist spoke with RN from the [address] [facility] to obtain medication list. 
? List to be faxed over. Thank Don Gannon, KristelD, BCPS   Contact: [phone]

## 2019-05-06 NOTE — PROGRESS NOTES
Palliative Medicine Code Status: DNR Advance Care Planning 5/6/2019 Patient's Healthcare Decision Maker is: Legal Next of Kin: Wife Amrita Pérez, [phone] Confirm Advance Directive Yes, not on file Patient Would Like to Complete Advance Directive Already in place, per wife; copy is not on file Does the patient have other document types Durable Do Not Resuscitate Patient / Family Encounter Documentation Participants (names): Wife Jaylan Vasquez, Palliative Medicine (Dr. Jonathan Valdovinos, NP Soraya Moreno) Narrative:  Met with family in ED; pt/wife are known to Palliative team from prior admission. Pt was discharged from [facility] to [facility] 4/26, was sent to ED 4/29 after falls at facility, was discharged same date to the [facility], as wife was unhappy with care at [address] [place] [address]. Pt was sent back to [facility] today with hypoxia, weakness, and tachycardia. Wife was tearful during visit, expressed desire to take pt home, per pt's wishes. Pt previously had caregiver in mornings and evenings 7 days/week, wife stated she has already reached out to this cg re: resuming services. Hospice was discussed as a means to support pt and family, as pt appears to be reaching end of life. Dr. Jonathan Valdovinos updated family re: current medical issues, discussed concern re: pt's ability to recover. Dtr expressed belief that pt is \"on a hamster wheel,\" not making any progress, stated she does not want to prolong pt's suffering. Psychosocial Issues Identified/ Resilience Factors:  Anticipatory grief, pt and wife have been  62 yrs. Wife appears to have some difficulty retaining information, which was also observed during prior hospitalization. Support is limited; Nanci Gonzalez lives in [place], other dtr resides in [facility]. Goals of Care / Plan:  Wife agreed for pt to be admitted today under inpt hospice care, with plan to transition pt home once arrangements are in place, including delivery of DME and arranging caregivers; family advised that hospice is not present in the home 24/7. Pt already has DDNR in place. Discussed with [address] [place]. SW will be available for additional support/assistance, as needed. Thank you for including Palliative Medicine in the care of Mr. Edith Smith. Yocasta Negron LCSW, [facility]- 
[phone])

## 2019-05-06 NOTE — PROGRESS NOTES
Spiritual Care Assessment/Progress Note [address] 
 
 
NAME: Kenya Dobson      MRN: [id number] AGE: 80 y.o. SEX: male Latter-day Affiliation: No preference Language: Georgette Justine 5/6/2019     Total Time (in minutes): 20 Spiritual Assessment begun in [facility] EMERGENCY DEPT through conversation with: 
  
    [x]Patient        [x] Family    [] Friend(s) Reason for Consult: Initial/Spiritual assessment, patient floor, Request by staff Spiritual beliefs: (Please include comment if needed) [x] Identifies with a timothy tradition: Mariella    
   [] Supported by a timothy community:        
   [] Claims no spiritual orientation:       
   [] Seeking spiritual identity:            
   [] Adheres to an individual form of spirituality:       
   [] Not able to assess:                   
 
    
Identified resources for coping:  
   [] Prayer                           
   [] Music                  [] Guided Imagery [x] Family/friends                 [] Pet visits [] Devotional reading                         [] Unknown 
   [] Other:                                         
 
 
Interventions offered during this visit: (See comments for more details) Patient Interventions: Iconic (affirming the presence of God/Higher Power), Affirmation of emotions/emotional suffering Family/Friend(s): Affirmation of emotions/emotional suffering, Affirmation of timothy, Catharsis/review of pertinent events in supportive environment, Coping skills reviewed/reinforced, Iconic (affirming the presence of God/Higher Power), Normalization of emotional/spiritual concerns, Prayer (actual)(Wife [employer])) Plan of Care: 
 
 [] Support spiritual and/or cultural needs  
 [] Support AMD and/or advance care planning process    
 [] Support grieving process 
 [] Coordinate Rites and/or Rituals  
 [] Coordination with community clergy [] No spiritual needs identified at this time [] Detailed Plan of Care below (See Comments)  [] Make referral to Music Therapy 
[] Make referral to Pet Therapy    
[] Make referral to Addiction services 
[] Make referral to [facility] 
[] Make referral to Spiritual Care Partner 
[] No future visits requested       
[x] Follow up visits as needed Comments:  requested for initial spiritual assessment. Patient being treated by staff in Emergency Room (ER) room 2, spouse is expressing anticipatory grief and having difficulty. Provided spiritual presence and listening as his wife, Pavithra Churchill, spoke of her current thoughts, feelings and concerns. She stated that she is feeling awful. Says she and her  have been  for 62 years and it is difficult to think about life without him. Her hope is that he will regain his health to be discharged to home. She is also expressing feelings of guilt blaming herself for his condition saying that she should have brought him home from the facility he has been living at. She feels that this event would not have occurred had she been with him and had he been home. Provided words of comfort and hope offering a prayer. The patient's daughter is on her way to the hospital from [facility]. and will be arriving shortly. A friend of the family is here to provide comfort and support. Will continue to follow up as needed and upon request as able. Visited by Rev. Melina Gramajo [facility]  paging service: [phone])

## 2019-05-07 NOTE — ROUTINE PROCESS
Bedside and Verbal shift change report given to Rosemary Loomis RN (oncoming nurse) by David Woodruff (offgoing nurse). Report included the following information SBAR, Kardex and Quality Measures.

## 2019-05-07 NOTE — PROGRESS NOTES
Bedside shift change report given to [facility] (oncoming nurse) by Gideon Cash (offgoing nurse). Report included the following information SBAR, Kardex, Procedure Summary, MAR and Quality Measures.

## 2019-05-07 NOTE — PROGRESS NOTES
Patient's wife given discharge instructions, Jose Groves with hospice in room making bed arrangements for patient. PIV removed. Baez remains in patient by order. Patient's wife verbalizes understanding of hospice plan of care. Signed discharge instructions on chart.

## 2019-05-07 NOTE — PROGRESS NOTES
This is a late entry note. Oswaldo Goodman, hospice nurse, called me this morning because patient's wife once again was having reservations about hospice care. As our notes clearly annotate, we had multiple discussions with her on 5/6 explaining what hospice care can and cannot do. Patient has continued to show decline in our team still feels hospice is the most appropriate care in the situation. Ultimately, I had another discussion with patient's spouse via phone. I once again had to review all the medical issues that we discussed yesterday at least twice. I do have concerns about the patient's wife's cognition and total understanding of the complexities of her 's medical issues. She again focused on how we were going to \"treat his pneumonia \". Again reviewed are concerned about aspiration and even if we could give antibiotics to treat this particular infection, this likely will recur. After much discussion, we agreed to send him home on Augmentin. Explained again my concern on how well he will be able to swallow his pills or antibiotics given his encephalopathy and swallowing/dysphasia. Patient continued to ask the same questions repetitively and I finally had to tell her that nobody is forcing her to proceed with hospice care and if that is what she does not want we can cancel all of what has been arranged. I did ask her if her daughter was still in the area or has she returned home. Apparently her daughter is at the home waiting for delivery of equipment. By the end of this 30-minute discussion, patient spouse has agreed to continue hospice care at home but I have serious concerns that she likely will revoke at some point. Unfortunately, the patient really needs the hospice team in this situation. Attempted multiple times to educate the patient's spouse and what hospice will provide and supporting her and him. Discussion was reviewed with Oswaldo Goodman, hospice nurse and patient will be discharged today.

## 2019-05-07 NOTE — HOSPICE
This morning, patient's wife states she is ready for discharge as soon as possible. I explained that his physician needed to evaluate him before he could leave, but she will refuse to wait. Discharge order obtained from Dr. Eliza Mann. Patient's wife very confrontational about equipment delivery being immediate. Confirmed equipment delivery with [facility] Contreras by 12. Called Eliza in office for confirmation of tuck in nurse. She then called back and stated that the wife wanted to cancel hospice. This RN called Dr. Eliza Mann for further instruction. Dr. Eliza Mann talked with wife and she then decided to try hospice at home. She became tearful saying that they had been  for 58 years and she doesn't want to lose him, but she's willing for now to give it a try.

## 2019-10-14 ENCOUNTER — DOCUMENTATION ONLY (OUTPATIENT)
Dept: PALLATIVE CARE | Age: 84
End: 2019-10-14

## 2019-10-14 NOTE — PROGRESS NOTES
Palliative medicine     Call received through the office from Ms. Per Arevalo. She was very upset with my admin team, would not give her name or reason for calling. I called her promptly back at [phone]. She states that she found a DDNR with my name on it dated from 4/24/19 and that she does not know who I am, she does not know what this document is- and \"how dare you sign this , you cannot even read my 's handwriting\". I look through pt's chart, I did meet patient and wife, clearly documented by my note and Terrie Amaral LCSW's note 4/26/19. Wife present, discussed care in detail, DNR discussed and DDNR signed- clearly gave wife copy per double documentation. There is also another DDNR form signed later by a provider in our system in May, where wife signed. There was also documentation by our NP Lucy Siemens the 4/23/19 about this as well. There is clear documentation about there were concerns about wife understanding situation- per attending 4/24/19 \"I suspect she has significant dementia of her own; she does not remember anything from our interaction in the ED after being reminded of specific details\"     I compassionately try to discuss this w/ wife, who gets more and more upset saying \"I'm going to take legal action\" at which point I say I think we need to end this discussion and will direct her to patient advocacy. I am also wondering if we need to discuss this woman's distress to other family members.      Will discuss w/ [facility] team.